# Patient Record
Sex: FEMALE | Race: BLACK OR AFRICAN AMERICAN | NOT HISPANIC OR LATINO | Employment: UNEMPLOYED | URBAN - METROPOLITAN AREA
[De-identification: names, ages, dates, MRNs, and addresses within clinical notes are randomized per-mention and may not be internally consistent; named-entity substitution may affect disease eponyms.]

---
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
## 2019-06-26 ENCOUNTER — OFFICE VISIT - HEALTHEAST (OUTPATIENT)
Dept: INTERNAL MEDICINE | Facility: CLINIC | Age: 19
End: 2019-06-26

## 2019-06-26 LAB — HCG UR QL: NEGATIVE

## 2019-06-27 ENCOUNTER — COMMUNICATION - HEALTHEAST (OUTPATIENT)
Dept: INTERNAL MEDICINE | Facility: CLINIC | Age: 19
End: 2019-06-27

## 2020-08-24 ENCOUNTER — RECORDS - HEALTHEAST (OUTPATIENT)
Dept: LAB | Facility: CLINIC | Age: 20
End: 2020-08-24

## 2020-08-26 LAB
GAMMA INTERFERON BACKGROUND BLD IA-ACNC: 0.07 IU/ML
M TB IFN-G BLD-IMP: NEGATIVE
MITOGEN IGNF BCKGRD COR BLD-ACNC: 0 IU/ML
MITOGEN IGNF BCKGRD COR BLD-ACNC: 0 IU/ML
QTF INTERPRETATION: NORMAL
QTF MITOGEN - NIL: 5.3 IU/ML

## 2021-05-30 NOTE — PROGRESS NOTES
"[facility] Clinic Note    Patient Name: Ana Singh  Patient Age: 19 y.o.  YOB: 2000  MRN: [id number]    Date of visit: 6/26/2019    Assessment/Plan:  No results found for this or any previous visit (from the past 24 hour(s)).  No medications were ordered this encounter      ICD-10-CM    1. Contraception Z78.9 Ambulatory referral to Midwifery       We discussed risks and benefits for IUD versus depo provera.  She would like to get an IUD I will have her see midwifery for this.  I advised her to use condoms in the meantime.    Patient Instructions   Use condoms until you are able to get in with midwife and for 7 days after iud.      Counseled patient regarding treatments, treatment options, risks and benefits and diagnosis.  The patient was interactive, attentive, verbalized understanding, and we discussed plan.       There is no problem list on file for this patient.    Social History     Social History Narrative     Not on file     No family history on file.  No outpatient encounter medications on file as of 6/26/2019.     No facility-administered encounter medications on file as of 6/26/2019.        Chief Complaint:   Chief Complaint   Patient presents with     Contraception     pt requesting birth control, depo shot        /62 (Patient Site: Left Arm, Patient Position: Sitting, Cuff Size: Adult Regular)   Pulse 98   Resp 20   Wt 158 lb 3 oz (71.8 kg)   LMP 08/03/2018 (Exact Date)   Breastfeeding? No   BMI 23.36 kg/m    HPI:   Has one sexual partner, male, uses condoms intermittently.      Last unprotected sex 6/20, has not had menses since being pregnant    What duration planning to use contraception:\"long time\"  Which method are you interested in:depo    Do you have painful periods, heavy periods, PMS: not that she knows of    How many sexual partners past year:1    Hormonal IUD:    Abnormal uterine bleeding:  Known uterine abnormality:no  Current foul vaginal discharge:no  Liver " disease: no  History of breast cancer: no            ROS: Pertinent ros findings in hpi, all other systems negative.    Objective/Physical Exam:     /62 (Patient Site: Left Arm, Patient Position: Sitting, Cuff Size: Adult Regular)   Pulse 98   Resp 20   Wt 158 lb 3 oz (71.8 kg)   LMP 08/03/2018 (Exact Date)   Breastfeeding? No   BMI 23.36 kg/m      Gen: NAD, appears age  Skin: warm, dry  HENT: normocephalic atraumatic, MMM  Eyes: non-icteric, no proptosis  CV: NRRR no m/r/g, no peripheral edema  Resp: CTAB no w/r/r, normal respiratory effort  Abd: non-distended, soft  Hematologic: No petechiae or purpura  MSK: no muscle or joint swelling  Neuro: no dysarthria or gross asymmetry  Psych: Cooperative, full affect        John Paul Jhaveri MD

## 2021-06-02 VITALS — WEIGHT: 163 LBS | HEIGHT: 69 IN | BODY MASS INDEX: 24.14 KG/M2

## 2021-06-03 VITALS — BODY MASS INDEX: 23.36 KG/M2 | WEIGHT: 158.19 LBS

## 2021-06-20 NOTE — PROGRESS NOTES
"[facility] Clinic Note    Patient Name: Ana Singh  Patient Age: 18 y.o.  YOB: 2000  MRN: [id number]    Date of visit: 9/7/2018    There is no problem list on file for this patient.    Social History     Social History Narrative     No family history on file.  No outpatient encounter prescriptions on file as of 9/7/2018.     No facility-administered encounter medications on file as of 9/7/2018.        Chief Complaint:   Chief Complaint   Patient presents with     Follow-up     from Hospital       /52 (Patient Site: Left Arm, Patient Position: Sitting, Cuff Size: Adult Regular)  Pulse 72  Temp 98.3  F (36.8  C) (Oral)   Ht 5' 9\" (1.753 m)  Wt 163 lb (73.9 kg)  LMP 08/03/2018 (Exact Date)  SpO2 98%  BMI 24.07 kg/m2  HPI:   No vaginal bleeding or abd pain.  First pregnancy.  LMP 8/3.  No symptoms currently.  No vomiting.    Non-smoker.  Family history noncontributory.    ROS: Pertinent ros findings in hpi, all other systems negative.    Objective/Physical Exam:     /52 (Patient Site: Left Arm, Patient Position: Sitting, Cuff Size: Adult Regular)  Pulse 72  Temp 98.3  F (36.8  C) (Oral)   Ht 5' 9\" (1.753 m)  Wt 163 lb (73.9 kg)  LMP 08/03/2018 (Exact Date)  SpO2 98%  BMI 24.07 kg/m2    Gen: NAD, appears age  Skin: warm, dry  HENT: normocephalic atraumatic, MMM  Eyes: non-icteric, no proptosis  CV: NRRR no m/r/g, no peripheral edema  Resp: CTAB no w/r/r, normal respiratory effort  Abd: non-distended, soft  Hematologic: No petechiae or purpura  MSK: no muscle or joint swelling  Neuro: no dysarthria or gross asymmetry  Psych: Cooperative, full affect    tender to palpation:slight right lower  soft:Yes  distended:no  pain with percussion:  no  hernia palpated: no  hepato-splenomegaly:no  masses: no      ecchymoses:no    suprapubic tenderness:no  >3cm pulsating mass: no    Exam limited by body habitus: no         Assessment/Plan:  No results found for this or any previous visit " (from the past 24 hour(s)).  No medications were ordered this encounter      ICD-10-CM    1. Missed period N92.6 US OB < 14 Weeks With Transvaginal     Beta-hCG, Quantitative     HM1(CBC and Differential)     Comprehensive Metabolic Panel     HM1 (CBC with Diff)       Previous records reviewed, we are repeating a pelvic ultrasound as well as a quantitative hCG, if these are in keeping with early pregnancy I will refer her to obstetrics.    There are no Patient Instructions on file for this visit.    Counseled patient regarding treatments, treatment options, risks and benefits and diagnosis.  The patient was interactive, attentive, verbalized understanding, and we discussed plan.     John Paul Jhaveri MD

## 2021-07-03 NOTE — ADDENDUM NOTE
Addendum Note by June Jhaveri MD at 9/7/2018  3:40 PM     Author: June Jhaveri MD Service: -- Author Type: Physician    Filed: 9/7/2018  3:40 PM Encounter Date: 9/7/2018 Status: Signed    : June Jhaveri MD (Physician)    Addended by: JUNE JHAVERI on: 9/7/2018 03:40 PM        Modules accepted: Orders

## 2021-11-03 ENCOUNTER — OFFICE VISIT (OUTPATIENT)
Dept: FAMILY MEDICINE | Facility: CLINIC | Age: 21
End: 2021-11-03
Payer: COMMERCIAL

## 2021-11-03 VITALS
SYSTOLIC BLOOD PRESSURE: 133 MMHG | TEMPERATURE: 98.5 F | RESPIRATION RATE: 16 BRPM | HEART RATE: 87 BPM | DIASTOLIC BLOOD PRESSURE: 78 MMHG | BODY MASS INDEX: 22.33 KG/M2 | OXYGEN SATURATION: 100 % | WEIGHT: 151.2 LBS

## 2021-11-03 DIAGNOSIS — B37.31 YEAST INFECTION OF THE VAGINA: ICD-10-CM

## 2021-11-03 DIAGNOSIS — Z91.410 HISTORY OF ADULT DOMESTIC PHYSICAL ABUSE: ICD-10-CM

## 2021-11-03 DIAGNOSIS — Z20.2 POSSIBLE EXPOSURE TO STD: Primary | ICD-10-CM

## 2021-11-03 LAB
CLUE CELLS: ABNORMAL
TRICHOMONAS, WET PREP: ABNORMAL
WBC'S/HIGH POWER FIELD, WET PREP: ABNORMAL
YEAST, WET PREP: PRESENT

## 2021-11-03 PROCEDURE — 99203 OFFICE O/P NEW LOW 30 MIN: CPT | Mod: GC

## 2021-11-03 PROCEDURE — 87210 SMEAR WET MOUNT SALINE/INK: CPT

## 2021-11-03 PROCEDURE — 87591 N.GONORRHOEAE DNA AMP PROB: CPT

## 2021-11-03 PROCEDURE — 87491 CHLMYD TRACH DNA AMP PROBE: CPT

## 2021-11-03 RX ORDER — FLUCONAZOLE 150 MG/1
150 TABLET ORAL ONCE
Qty: 1 TABLET | Refills: 0 | Status: SHIPPED | OUTPATIENT
Start: 2021-11-03 | End: 2021-11-03
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
## 2021-11-03 NOTE — PROGRESS NOTES
Preceptor Attestation:    I discussed the patient with the resident and evaluated the patient in person. I have verified the content of the note, which accurately reflects my assessment of the patient and the plan of care.   Supervising Physician:  Arnold Alatorre DO.

## 2021-11-04 ENCOUNTER — DOCUMENTATION ONLY (OUTPATIENT)
Dept: PSYCHOLOGY | Facility: CLINIC | Age: 21
End: 2021-11-04
Payer: COMMERCIAL

## 2021-11-04 LAB
C TRACH DNA SPEC QL NAA+PROBE: NEGATIVE
N GONORRHOEA DNA SPEC QL NAA+PROBE: NEGATIVE

## 2021-11-04 NOTE — PROGRESS NOTES
Behavioral Health Team,    Patient is being referred for mental health services by their provider, Dr. Ochoa.  Please advise if we are able to see patient for in house treatment or if a community option would be best.    Thank you,    Kat Vazquez  11/4/2021

## 2021-11-05 ENCOUNTER — TELEPHONE (OUTPATIENT)
Dept: FAMILY MEDICINE | Facility: CLINIC | Age: 21
End: 2021-11-05

## 2021-11-05 NOTE — TELEPHONE ENCOUNTER
[facility] phone call message- patient requesting results:    Test: Lab    Date of test: 11/3/2021    Additional Comments: would like a call back     OK to leave a message on voice mail? Yes    Primary language: English      needed? No    Call taken on November 5, 2021 at 11:49 AM by Douglas Lauren
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
## 2021-11-17 NOTE — PROGRESS NOTES
Pt scheduled    Appointment: Monday November 29  Arrival Time: 3:00pm  Provider: Dr. Young    Also schedule CPE visit     Appointment: December 22nd  Arrival Time: 2:30pm  Provider: Dr. Ochoa

## 2021-11-29 ENCOUNTER — TELEPHONE (OUTPATIENT)
Dept: PSYCHOLOGY | Facility: CLINIC | Age: 21
End: 2021-11-29
Payer: COMMERCIAL

## 2021-11-29 NOTE — TELEPHONE ENCOUNTER
Attempted to contact [place], for outreach after being unable to reach her for in-person counseling appointment with this provider  at 3:00 PM. This is patient's first no-show or late cancellation with this provider.    Attempted to contact patient by phone number recorded in medical record ending [id number] at 3:15 PM. Patient did not answer.    Left a voicemail message requesting the patient call the [facility]  at [phone] to re-schedule the appointment should she be interested.     Plan:   Primary Care/Referring Provider Dr. Ochoa will be alerted to this note for awareness.    Patient has a follow up Physical appointment scheduled 12/22 with Dr. Ochoa.    A letter will be mailed to the address on file. Following that letter, no additional outreach attempts will be made unless this provider is contacted by another referring provider.     Steve Young, PhD  Pronouns: She/her/hers  Primary Care Health Behavior Fellow

## 2021-12-02 NOTE — TELEPHONE ENCOUNTER
I have reviewed and agree with the behavioral health fellow's summary and recommendations.  Mansi Bains, PhD., LP

## 2021-12-09 ENCOUNTER — TELEPHONE (OUTPATIENT)
Dept: FAMILY MEDICINE | Facility: CLINIC | Age: 21
End: 2021-12-09

## 2021-12-09 NOTE — TELEPHONE ENCOUNTER
[facility] phone call message- general phone call:    Reason for call: pt would like a presc for yeast inf. Please call to [facility] pharmacy in [place]. Phone # [phone].    Action desired: please call presc in     Return call needed: No    OK to leave a message on voice mail? No    Advised patient to response may take up to 2 business days: No    Primary language: English      needed? No    Call taken on December 9, 2021 at 4:09 PM by Chastity Walls

## 2021-12-23 ENCOUNTER — TELEPHONE (OUTPATIENT)
Dept: PSYCHOLOGY | Facility: CLINIC | Age: 21
End: 2021-12-23
Payer: COMMERCIAL

## 2021-12-23 NOTE — TELEPHONE ENCOUNTER
Attempted to contact Ana, for outreach after being unable to reach her for in-person counseling appointment with this provider  at 3:00 PM on 11/29. This was patient's first no-show or late cancellation with this provider.     Attempted to contact patient by phone number recorded in medical record ending [id number] at 12/23. Patient did not answer.     Left a voicemail message requesting the patient call the [facility]  at [phone] to re-schedule the appointment should she be interested.      Plan:   Primary Care/Referring Provider Dr. Ochoa will be alerted to this note for awareness.     Patient has no follow up appointments at this time.      A letter will be mailed to the address on file. Following that letter, no additional outreach attempts will be made unless this provider is contacted by another referring provider.      Steve Young, PhD  Pronouns: She/her/hers  Primary Care Health Behavior Fellow

## 2022-01-03 ENCOUNTER — OFFICE VISIT (OUTPATIENT)
Dept: FAMILY MEDICINE | Facility: CLINIC | Age: 22
End: 2022-01-03
Payer: COMMERCIAL

## 2022-01-03 VITALS
BODY MASS INDEX: 24.14 KG/M2 | TEMPERATURE: 99.2 F | WEIGHT: 163 LBS | RESPIRATION RATE: 18 BRPM | HEART RATE: 84 BPM | DIASTOLIC BLOOD PRESSURE: 76 MMHG | HEIGHT: 69 IN | SYSTOLIC BLOOD PRESSURE: 130 MMHG | OXYGEN SATURATION: 97 %

## 2022-01-03 DIAGNOSIS — F41.9 ANXIETY: ICD-10-CM

## 2022-01-03 DIAGNOSIS — N89.8 VAGINAL DISCHARGE: Primary | ICD-10-CM

## 2022-01-03 DIAGNOSIS — Z31.9 PATIENT DESIRES PREGNANCY: ICD-10-CM

## 2022-01-03 DIAGNOSIS — B37.31 CANDIDIASIS OF VAGINA: ICD-10-CM

## 2022-01-03 PROCEDURE — 99213 OFFICE O/P EST LOW 20 MIN: CPT | Mod: GC | Performed by: STUDENT IN AN ORGANIZED HEALTH CARE EDUCATION/TRAINING PROGRAM

## 2022-01-03 PROCEDURE — 87210 SMEAR WET MOUNT SALINE/INK: CPT | Performed by: STUDENT IN AN ORGANIZED HEALTH CARE EDUCATION/TRAINING PROGRAM

## 2022-01-03 RX ORDER — FLUCONAZOLE 150 MG/1
150 TABLET ORAL ONCE
Qty: 1 TABLET | Refills: 0 | Status: SHIPPED | OUTPATIENT
Start: 2022-01-03 | End: 2022-01-03

## 2022-01-03 RX ORDER — PRENATAL VIT/IRON FUM/FOLIC AC 27MG-0.8MG
1 TABLET ORAL DAILY
Qty: 90 TABLET | Refills: 3 | Status: SHIPPED | OUTPATIENT
Start: 2022-01-03 | End: 2022-05-18

## 2022-01-03 ASSESSMENT — MIFFLIN-ST. JEOR: SCORE: 1555.8

## 2022-01-03 NOTE — PROGRESS NOTES
"Preceptor attestation:  Vital signs reviewed: /76 (BP Location: Right arm, Patient Position: Sitting, Cuff Size: Adult Regular)   Pulse 84   Temp 99.2  F (37.3  C) (Oral)   Resp 18   Ht 1.74 m (5' 8.5\")   Wt 73.9 kg (163 lb)   LMP 12/24/2021 (Approximate)   SpO2 97%   BMI 24.42 kg/m      Patient seen, evaluated, and discussed with the resident.  I have verified the content of the note, which accurately reflects my assessment of the patient and the plan of care.    Supervising physician: Chela Finney MD  [facility]  "
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
## 2022-01-03 NOTE — PATIENT INSTRUCTIONS
Thank you for discussing your health with us today!    We discussed the following during your visit:    1. Treatment for yeast infection is a one time oral medication called Fluconazole/Diflucan.  2. Please start taking the vitamins.  3. Please let us know if you think you're pregnant. We're happy to test.  4. I have referred you to our mental health team. Please call if you have not heard back from us in 2 weeks.     As always, please call the clinic if you have any questions or concerns.    Your doctor has put in a mental health referral for psychotherapy and/or psychiatry treatment. Our behavioral health team will evaluate your referral to see if they have any more recommendations or if they have openings for therapy in-house at [facility]. Our referral team would then contact you within 1-2 weeks with this update. However, if you want to get started for psychotherapy or schedule with a psychiatrist sooner, we recommend you call one or two of the following resources.  Let your doctor know if you do connect with a therapist or psychiatrist.     Community Mental Health Agencies:    [facility] [address] [address]  [phone] (for appointments)    [facility]  [address]  [place] [address]  Phone:  [phone]    [facility]  [address]  [place] [address]  [phone]    [facility]  [address]  [place], [place] [address]  [phone]    [facility]  [address]  [place] [address]  [phone]    Community Psychiatry Agencies:    [facility]  [address], [place] [address]  [phone]    [facility]  [address] [place] [address]  [phone] (for appointments)    [facility]  [address]  [address]  [place] [address]  Phone:  [phone]    [facility]  [address]  [place], [place] [address] [phone]    [facility]  [address]  [place] [address]  [phone]    [facility] - offers psychiatry and psychology services across the lifespan  [facility]  [address] [place] [address] [address]  [place], [place]  [phone]

## 2022-03-19 DIAGNOSIS — Z11.3 SCREENING EXAMINATION FOR SEXUALLY TRANSMITTED DISEASE: Primary | ICD-10-CM
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
## 2022-03-21 ENCOUNTER — OFFICE VISIT (OUTPATIENT)
Dept: FAMILY MEDICINE | Facility: CLINIC | Age: 22
End: 2022-03-21
Payer: COMMERCIAL

## 2022-03-21 VITALS
HEART RATE: 82 BPM | SYSTOLIC BLOOD PRESSURE: 106 MMHG | BODY MASS INDEX: 24.06 KG/M2 | TEMPERATURE: 98.6 F | WEIGHT: 160.6 LBS | RESPIRATION RATE: 16 BRPM | DIASTOLIC BLOOD PRESSURE: 70 MMHG | OXYGEN SATURATION: 97 %

## 2022-03-21 DIAGNOSIS — Z11.3 SCREENING EXAMINATION FOR SEXUALLY TRANSMITTED DISEASE: Primary | ICD-10-CM

## 2022-03-21 DIAGNOSIS — F41.9 ANXIETY: ICD-10-CM

## 2022-03-21 DIAGNOSIS — F43.10 PTSD (POST-TRAUMATIC STRESS DISORDER): ICD-10-CM

## 2022-03-21 DIAGNOSIS — A60.00 GENITAL HERPES SIMPLEX, UNSPECIFIED SITE: ICD-10-CM

## 2022-03-21 LAB
CLUE CELLS: ABNORMAL
HCV AB SERPL QL IA: NEGATIVE
HIV 1+2 AB+HIV1 P24 AG SERPL QL IA: NEGATIVE
T PALLIDUM AB SER QL: NONREACTIVE
TRICHOMONAS, WET PREP: ABNORMAL
WBC'S/HIGH POWER FIELD, WET PREP: ABNORMAL
YEAST, WET PREP: ABNORMAL

## 2022-03-21 PROCEDURE — 87210 SMEAR WET MOUNT SALINE/INK: CPT | Performed by: STUDENT IN AN ORGANIZED HEALTH CARE EDUCATION/TRAINING PROGRAM

## 2022-03-21 PROCEDURE — 99214 OFFICE O/P EST MOD 30 MIN: CPT | Mod: GC | Performed by: STUDENT IN AN ORGANIZED HEALTH CARE EDUCATION/TRAINING PROGRAM

## 2022-03-21 PROCEDURE — 87389 HIV-1 AG W/HIV-1&-2 AB AG IA: CPT | Performed by: STUDENT IN AN ORGANIZED HEALTH CARE EDUCATION/TRAINING PROGRAM

## 2022-03-21 PROCEDURE — 87591 N.GONORRHOEAE DNA AMP PROB: CPT | Performed by: STUDENT IN AN ORGANIZED HEALTH CARE EDUCATION/TRAINING PROGRAM

## 2022-03-21 PROCEDURE — 87491 CHLMYD TRACH DNA AMP PROBE: CPT | Performed by: STUDENT IN AN ORGANIZED HEALTH CARE EDUCATION/TRAINING PROGRAM

## 2022-03-21 PROCEDURE — 86803 HEPATITIS C AB TEST: CPT | Performed by: STUDENT IN AN ORGANIZED HEALTH CARE EDUCATION/TRAINING PROGRAM

## 2022-03-21 PROCEDURE — 86780 TREPONEMA PALLIDUM: CPT | Performed by: STUDENT IN AN ORGANIZED HEALTH CARE EDUCATION/TRAINING PROGRAM

## 2022-03-21 PROCEDURE — 36415 COLL VENOUS BLD VENIPUNCTURE: CPT | Performed by: STUDENT IN AN ORGANIZED HEALTH CARE EDUCATION/TRAINING PROGRAM

## 2022-03-21 RX ORDER — VALACYCLOVIR HYDROCHLORIDE 500 MG/1
500 TABLET, FILM COATED ORAL 2 TIMES DAILY
Qty: 6 TABLET | Refills: 3 | Status: SHIPPED | OUTPATIENT
Start: 2022-03-21 | End: 2022-05-03

## 2022-03-21 ASSESSMENT — ANXIETY QUESTIONNAIRES
6. BECOMING EASILY ANNOYED OR IRRITABLE: NEARLY EVERY DAY
1. FEELING NERVOUS, ANXIOUS, OR ON EDGE: NEARLY EVERY DAY
7. FEELING AFRAID AS IF SOMETHING AWFUL MIGHT HAPPEN: MORE THAN HALF THE DAYS
3. WORRYING TOO MUCH ABOUT DIFFERENT THINGS: NEARLY EVERY DAY
GAD7 TOTAL SCORE: 19
2. NOT BEING ABLE TO STOP OR CONTROL WORRYING: NEARLY EVERY DAY
5. BEING SO RESTLESS THAT IT IS HARD TO SIT STILL: NEARLY EVERY DAY

## 2022-03-21 ASSESSMENT — PATIENT HEALTH QUESTIONNAIRE - PHQ9
5. POOR APPETITE OR OVEREATING: MORE THAN HALF THE DAYS
SUM OF ALL RESPONSES TO PHQ QUESTIONS 1-9: 17
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
## 2022-03-21 NOTE — PROGRESS NOTES
I have reviewed and agree with the behavioral health fellow's documentation for this visit.  I did not personally see the patient.  Mansi Bains, PhD., LP

## 2022-03-21 NOTE — PROGRESS NOTES
Preceptor Attestation:    I discussed the patient with the resident and evaluated the patient in person. I have verified the content of the note, which accurately reflects my assessment of the patient and the plan of care.   Supervising Physician:  Carlos Whitten MD.

## 2022-03-21 NOTE — PATIENT INSTRUCTIONS
Dear Ana Singh,    It was nice to meet you!    1. Take valacyclovir 500 mg 2 times per day for 3 days  2.  the next refill to have on hand  3. Set up MyChart  4. Come back for a follow-up visit or general physical in the next few weeks. I'd love to see you again and see how you're doing.   5. If you have concerns about your kiddo or if he's due for his yearly check-up, I see kids too.  You can schedule your and his visits together if you'd like.      As always, please call the clinic if you have any questions or concerns. A doctor or nurse is available 24/7 to answer questions.     Be well,    Dr. Estrellita Kenyon          Patient Education     Genital Herpes    Genital herpes is a common sexually transmitted infection (STI). It is caused by the herpes simplex virus (HSV). One out of 5 teens and adults carry the herpes virus. During an outbreak, it causes small blisters that break open, leaving small, painful sores in the genital area. Eventually, scabs form and the sores heal. In women, these show up most often on the skin just outside the vaginal opening. They can occur on the buttocks, anus, or cervix. In men, the sores are usually on the tip, sides, or base of the penis. They also occur on the scrotum, buttocks, or thighs.  The first outbreak begins within 2 to 3 weeks after exposure to an infected sexual partner. It may last 1 to 3 weeks. It may cause headache, muscle ache, and fevers. The first outbreak is usually the worst. Because the virus remains in the body even after the sores heal, most people will have more outbreaks. The frequency of outbreaks is different for each person. Some people will never have another outbreak. Others will have several episodes a year. Later outbreaks are usually shorter, milder, and less painful. For many, the number of outbreaks tends to decrease over time. Various factors may trigger an outbreak. These include:    Emotional stress    Menstruation    Presence of  another illness (cold, flu, or fever from any cause)    Overexertion and fatigue    Weak immune system  Home care    It is very important that you do not have sexual relations until all the herpes sores have healed completely.    Wash the affected area gently with mild soap and water. Wash your hands after touching the affected area.    You may use over-the-counter pain medicine unless another pain medicine was prescribed. If you have chronic liver or kidney disease or have ever had a stomach ulcer or gastrointestinal bleeding, talk with your healthcare provider before using these medicines. Also talk to your provider if you are taking medicine to prevent blood clots. Aspirin should never be given to anyone younger than 18 years of age who is ill with a viral infection or fever. It may cause severe liver or brain damage.    Your healthcare provider may prescribe antiviral medicine during the first outbreak. This will help the sores heal faster. Antiviral medicine may also be prescribed so that you have it ready to take at the first sign of another outbreak. This will help the symptoms go away sooner. For people with frequent outbreaks, daily preventive therapy may be prescribed. This will help reduce the frequency of attacks. Daily preventive therapy may also reduce risk of spread of herpes to your sexual partner. Discuss the risks and benefits of daily therapy with your healthcare provider.    If you are a woman who is pregnant now or may become pregnant in the future, let your healthcare provider know that you have had herpes. This may affect the way your baby is delivered.  Preventing spread to others  The virus is spread by sexual contact with someone who has the herpes virus. The risk of spread is highest when the sores are present. However, there is a chance of spreading the virus even when sores are not visible. Inform future sexual partners that you have herpes and that they may become infected. To reduce  the risk of passing the virus to a partner who has never had herpes, avoid sexual relations at the first sign of an outbreak and until the sores are fully healed. Latex barriers, such as condoms, reduce the risk of spread between outbreaks if the infected site is covered, but they do not guarantee protection.  Follow-up care  Follow up with your healthcare provider, or as advised.  People who have just learned that they have herpes may feel upset. Getting the facts about herpes can help you feel more in control. Follow up with your healthcare provider or the public health department for complete STI screening, including HIV testing.  When to seek medical advice  Call your healthcare provider right away if any of these occur:    Inability to urinate due to pain    Swelling or increasing redness in the genital area    Discharge from the vagina or penis    Increasing back or abdominal pain    Rash or joint pain  Call 911  Call 911 or get immediate medical care if any of these occur:    Unusual drowsiness, weakness, or confusion    Worsening headache or stiff neck  [employer] last reviewed this educational content on 6/1/2018 2000-2021 [employer]. All rights reserved. This information is not intended as a substitute for professional medical care. Always follow your healthcare professional's instructions.

## 2022-03-22 LAB
C TRACH DNA SPEC QL NAA+PROBE: NEGATIVE
N GONORRHOEA DNA SPEC QL NAA+PROBE: NEGATIVE

## 2022-03-22 ASSESSMENT — ANXIETY QUESTIONNAIRES: GAD7 TOTAL SCORE: 19

## 2022-04-11 ENCOUNTER — VIRTUAL VISIT (OUTPATIENT)
Dept: PSYCHOLOGY | Facility: CLINIC | Age: 22
End: 2022-04-11
Payer: COMMERCIAL

## 2022-04-11 DIAGNOSIS — F43.10 PTSD (POST-TRAUMATIC STRESS DISORDER): Primary | ICD-10-CM

## 2022-04-11 PROCEDURE — 90837 PSYTX W PT 60 MINUTES: CPT | Mod: 95 | Performed by: PSYCHOLOGIST
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
## 2022-04-18 NOTE — PATIENT INSTRUCTIONS
[facility]  Mental Health and Addiction Services  [address] [place] [address]  [phone]    First Session Patient Information Sheet    My name is Steve Young, Ph.D. and I look forward to working with you! I earned my Ph.D. in Counseling Psychology at [employer]. I am currently in a postdoctoral fellowship at [facility] to receive specialized training in primary care behavioral health. I am also working to complete my supervised hours to become a licensed psychologist in the [facility].     My direct supervisor at the [facility] is licensed psychologist Mansi Bains, Ph.D. She and I meet individually each week to discuss my training and clinical caseload. Just like the residents you may have worked with, the work you and I complete together will be billed under my supervisor's name. Therefore, you will likely see reports from your insurance provider with Dr. Bains's name rather than mine. Dr. Bains can be reached at [phone] if you have any questions or concerns.     Here is some general information about behavioral health services. Please note that your exact experience may be different based on our discussion today.    Your first 1-2 sessions are focused on assessment.This means that I will be exploring what brings you in today and what you are hoping to get out of behavioral health services. I will likely ask you a lot of questions about your current symptoms, health history, as well as information about your relationships, emotions, behaviors, experiences, childhood, family of origin, etc. I may also ask you to complete several questionnaires and checklists as part of that assessment process.    When the assessment is completed, we will review the results and work together to develop a plan for treatment. This discussion will include recommendations for services that are most appropriate for you based on available research  and practice guidelines. Many times, I will continue with your care unless it becomes clear that we need to refer you to another provider or organization better suited to meet your specific needs. We may also discuss other services that you may benefit from engaging with, such as the social work team or the clinic's legal partnership.    Most patients attend appointments once a week or once every other week at the beginning of treatment. However, we will discuss a schedule that best fits your needs.    If you get primary care services here at the clinic, I will also update your provider about your diagnosis and treatment plan to coordinate your care.    Due to confidentiality, I cannot exchange e-mail with patients. If you need to reach me, please leave a message through the  [phone].    I am committed to providing my patients with the best care possible. That means it is important to me that I provider services that fit for your unique needs and preferences. If there is anything you would like me to do differently, please let me know at any time!    Thank you, and I look forward to working with you!    Steve Young, PhD  She/her/hers  Primary Care Behavioral Health Fellow

## 2022-04-28 ENCOUNTER — IMMUNIZATION (OUTPATIENT)
Dept: NURSING | Facility: CLINIC | Age: 22
End: 2022-04-28
Payer: COMMERCIAL

## 2022-04-28 PROCEDURE — 91301 PR COVID VAC MODERNA 100 MCG/0.5 ML IM: CPT

## 2022-04-28 PROCEDURE — 0011A PR COVID VAC MODERNA 100 MCG/0.5 ML IM: CPT

## 2022-04-29 ENCOUNTER — TELEPHONE (OUTPATIENT)
Dept: FAMILY MEDICINE | Facility: CLINIC | Age: 22
End: 2022-04-29
Payer: COMMERCIAL

## 2022-04-29 ENCOUNTER — LAB (OUTPATIENT)
Dept: LAB | Facility: CLINIC | Age: 22
End: 2022-04-29
Payer: COMMERCIAL

## 2022-04-29 DIAGNOSIS — Z11.1 SCREENING EXAMINATION FOR PULMONARY TUBERCULOSIS: Primary | ICD-10-CM

## 2022-04-29 PROCEDURE — 86481 TB AG RESPONSE T-CELL SUSP: CPT

## 2022-04-29 PROCEDURE — 36415 COLL VENOUS BLD VENIPUNCTURE: CPT

## 2022-04-29 NOTE — TELEPHONE ENCOUNTER
Ana Singh called to schedule an appointment for TB screening.        TB Screening questions  1. Have you had recent contact with a person with active tuberculosis (TB)?   No, continue to next question.  2. Have you ever been treated for tuberculosis (TB) or latent TB before?  No, continue to next question.  3. Has a county worker or another healthcare worker (not your employer) told you to come in to be tested for TB?  No, continue to next question.  4. Have you had a live vaccine (smallpox, flumist, MMR, varicella, oral polio and/or yellow fever) in the last 4 weeks?  No, lab visit scheduled.       Lab visit scheduled for 4/29/2022 at 0930 am.    Myrna Cochran

## 2022-04-29 NOTE — PROGRESS NOTES
Ana Singh presents for a blood draw TB screening test.    TB Screening questions  1. Have you had recent contact with a person with active tuberculosis (TB)?  No, continue to next question.  2. Have you ever been treated for tuberculosis (TB) or latent TB before?  No, continue to next question.  3. Has a county worker or another healthcare worker (not your employer) told you to come in to be tested for TB?  No, continue to next question.  4. Have you had a live vaccine (smallpox, flumist, MMR, varicella, oral polio and/or yellow fever) in the last 4 weeks?  No, continued with lab visit/blood draw.    Educated patient about when to expect the lab results via phone/mail/Resilinchart.    Sandrita Andrade

## 2022-05-01 LAB
GAMMA INTERFERON BACKGROUND BLD IA-ACNC: 0.12 IU/ML
M TB IFN-G BLD-IMP: NEGATIVE
M TB IFN-G CD4+ BCKGRND COR BLD-ACNC: 9.88 IU/ML
MITOGEN IGNF BCKGRD COR BLD-ACNC: -0.02 IU/ML
MITOGEN IGNF BCKGRD COR BLD-ACNC: -0.02 IU/ML
QUANTIFERON MITOGEN: 10 IU/ML
QUANTIFERON NIL TUBE: 0.12 IU/ML
QUANTIFERON TB1 TUBE: 0.1 IU/ML
QUANTIFERON TB2 TUBE: 0.1

## 2022-05-16 ENCOUNTER — HEALTH MAINTENANCE LETTER (OUTPATIENT)
Age: 22
End: 2022-05-16

## 2022-05-17 ENCOUNTER — TELEPHONE (OUTPATIENT)
Dept: FAMILY MEDICINE | Facility: CLINIC | Age: 22
End: 2022-05-17
Payer: COMMERCIAL

## 2022-05-18 ENCOUNTER — OFFICE VISIT (OUTPATIENT)
Dept: FAMILY MEDICINE | Facility: CLINIC | Age: 22
End: 2022-05-18
Payer: COMMERCIAL

## 2022-05-18 VITALS
BODY MASS INDEX: 23.49 KG/M2 | HEART RATE: 86 BPM | TEMPERATURE: 99.3 F | RESPIRATION RATE: 16 BRPM | WEIGHT: 156.8 LBS | DIASTOLIC BLOOD PRESSURE: 77 MMHG | SYSTOLIC BLOOD PRESSURE: 125 MMHG | OXYGEN SATURATION: 99 %

## 2022-05-18 DIAGNOSIS — N89.8 VAGINAL DISCHARGE: Primary | ICD-10-CM

## 2022-05-18 DIAGNOSIS — B37.31 CANDIDIASIS OF VAGINA: ICD-10-CM

## 2022-05-18 PROCEDURE — 87210 SMEAR WET MOUNT SALINE/INK: CPT | Performed by: STUDENT IN AN ORGANIZED HEALTH CARE EDUCATION/TRAINING PROGRAM

## 2022-05-18 PROCEDURE — 99213 OFFICE O/P EST LOW 20 MIN: CPT | Mod: GC | Performed by: STUDENT IN AN ORGANIZED HEALTH CARE EDUCATION/TRAINING PROGRAM

## 2022-05-18 RX ORDER — FLUCONAZOLE 150 MG/1
150 TABLET ORAL ONCE
Qty: 1 TABLET | Refills: 0 | Status: SHIPPED | OUTPATIENT
Start: 2022-05-18 | End: 2022-05-18

## 2022-05-18 NOTE — PROGRESS NOTES
"[facility]    Assessment & Plan   1. Vaginal discharge  2. Candidiasis of vagina  + yeast, prescription for diflucan sent.   - Wet preparation   - fluconazole (DIFLUCAN) 150 MG tablet; Take 1 tablet (150 mg) by mouth once for 1 dose  Dispense: 1 tablet; Refill: 0       Options for treatment and follow-up care were reviewed with the patient who was engaged and actively involved in the decision making process, verbalized understanding of the options discussed, and satisfied with the final plan.    Patient was staffed with supervising physician, Dr. Alatorre.     Kat Rivas MD, PGY3  [facility]    Subjective   Ana Singh is a 21 year old female with a history including PTSD/anxiety, HSV2 who presents for possible yeast infection.    Vaginal Symptoms  Onset: Sunday night (day 4); noticed redness of the labia and mild swelling at first now discharge    Description:  Vaginal Discharge: Clumpy white discahrge \"like cottage cheese\"  Itching (Pruritis): Yes Details: only at night  Burning sensation: no  Odor: Yes Details: :\"yeasty smell\"    Accompanying Signs & Symptoms:  Pain with Urination: no  Abdominal Pain: no  Fever: no    History:   Sexually active: no  New Partner: no  Possibility of Pregnancy:  No    Precipitating factors:   Recent Antibiotic Use: no     Of note, patient has a history of yeast infections in the past.       Patient Active Problem List    Diagnosis Date Noted     PTSD (post-traumatic stress disorder) 03/21/2022     Priority: Medium     Genital herpes simplex, unspecified site 03/21/2022     Priority: Medium     Anxiety 03/21/2022     Priority: Medium       Social: She reports that she is a non-smoker but has been exposed to tobacco smoke. She has never used smokeless tobacco.    There are no exam notes on file for this visit.    Objective     Vitals:    05/18/22 1627   BP: 125/77   Pulse: 86   Resp: 16   Temp: 99.3  F (37.4  C)   SpO2: 99% "   Weight: 71.1 kg (156 lb 12.8 oz)     Body mass index is 23.49 kg/m .    GEN: NAD, healthy, alert  Constitutional: Awake, alert, cooperative, no acute distress, and appears stated age.  HEENT: NC/AT, EOMI, normal conjunctivae/sclerae, mask on  Lungs: No increased WOB.   Abdomen: Soft, non-distended, non-tender, no masses palpated  Musculoskeletal: No obvious redness, warmth, or swelling of the joints.  Neurologic: A&Ox3.  Cranial nerves II-XII are grossly intact.  Gait is normal  Neuropsychiatric: Normal affect, mood, orientation, memory and insight.  Skin: No visible rashes, erythema, pallor, petechia or purpura.

## 2022-06-03 ENCOUNTER — IMMUNIZATION (OUTPATIENT)
Dept: NURSING | Facility: CLINIC | Age: 22
End: 2022-06-03
Attending: FAMILY MEDICINE
Payer: COMMERCIAL

## 2022-06-03 PROCEDURE — 91301 PR COVID VAC MODERNA 100 MCG/0.5 ML IM: CPT

## 2022-06-03 PROCEDURE — 0012A PR COVID VAC MODERNA 100 MCG/0.5 ML IM: CPT

## 2022-07-07 ENCOUNTER — TELEPHONE (OUTPATIENT)
Dept: FAMILY MEDICINE | Facility: CLINIC | Age: 22
End: 2022-07-07

## 2022-07-08 ENCOUNTER — OFFICE VISIT (OUTPATIENT)
Dept: FAMILY MEDICINE | Facility: CLINIC | Age: 22
End: 2022-07-08
Payer: COMMERCIAL

## 2022-07-08 VITALS
HEIGHT: 68 IN | RESPIRATION RATE: 20 BRPM | SYSTOLIC BLOOD PRESSURE: 120 MMHG | BODY MASS INDEX: 23.52 KG/M2 | TEMPERATURE: 98.4 F | HEART RATE: 76 BPM | OXYGEN SATURATION: 99 % | WEIGHT: 155.2 LBS | DIASTOLIC BLOOD PRESSURE: 76 MMHG

## 2022-07-08 DIAGNOSIS — Z32.00 ENCOUNTER FOR PREGNANCY TEST, RESULT UNKNOWN: ICD-10-CM

## 2022-07-08 DIAGNOSIS — A60.00 GENITAL HERPES SIMPLEX, UNSPECIFIED SITE: ICD-10-CM

## 2022-07-08 DIAGNOSIS — Z63.79 STRESSFUL LIFE EVENT AFFECTING FAMILY: ICD-10-CM

## 2022-07-08 DIAGNOSIS — N89.8 VAGINAL ITCHING: Primary | ICD-10-CM

## 2022-07-08 LAB
CLUE CELLS: ABNORMAL
HCG UR QL: NEGATIVE
TRICHOMONAS, WET PREP: ABNORMAL
WBC'S/HIGH POWER FIELD, WET PREP: ABNORMAL
YEAST, WET PREP: PRESENT

## 2022-07-08 PROCEDURE — 99213 OFFICE O/P EST LOW 20 MIN: CPT | Mod: GC

## 2022-07-08 PROCEDURE — 81025 URINE PREGNANCY TEST: CPT

## 2022-07-08 PROCEDURE — 87210 SMEAR WET MOUNT SALINE/INK: CPT

## 2022-07-08 RX ORDER — FLUCONAZOLE 150 MG/1
150 TABLET ORAL ONCE
Qty: 1 TABLET | Refills: 0 | Status: SHIPPED | OUTPATIENT
Start: 2022-07-08 | End: 2022-07-08

## 2022-07-08 RX ORDER — VALACYCLOVIR HYDROCHLORIDE 500 MG/1
500 TABLET, FILM COATED ORAL 2 TIMES DAILY
Qty: 34 UNITS | Refills: 3 | Status: SHIPPED | OUTPATIENT
Start: 2022-07-08 | End: 2023-01-20

## 2022-07-08 NOTE — PROGRESS NOTES
"  {PROVIDER CHARTING PREFERENCE:196102}    Subjective   Maribell is a 22 year old{ACCOMPANIED BY STATEMENT (Optional):813156}, presenting for the following health issues:  Other (Yeast infection, itchy, smell, milky cloudy, want pregnancy test due to no protection)      HPI     {SUPERLIST (Optional):378057}  {additonal problems for provider to add (Optional):013709}    Nightly itching   Recurrent yeast infections  New soaps     Single parent now doing phlebotomy   Learning how to drive       Anxiety noted by parent teacher conference  Recent breakup with on again off again partner     Review of Systems   {ROS COMP (Optional):494611}      Anxiety, stress  Vaginal itching  Sharp chest pain with certain arm movements   Lump on R labia for a while, not painful   Objective    /76   Pulse 76   Temp 98.4  F (36.9  C) (Oral)   Resp 20   Ht 1.732 m (5' 8.2\")   Wt 70.4 kg (155 lb 3.2 oz)   SpO2 99%   BMI 23.46 kg/m    Body mass index is 23.46 kg/m .  Physical Exam   {Exam List (Optional):460147}    {Diagnostic Test Results (Optional):092526}    {AMBULATORY ATTESTATION (Optional):707317}            .  ..    "

## 2022-07-08 NOTE — PROGRESS NOTES
Assessment & Plan     Vaginal itching  Patient has been having genital itching, odor, milky discharge since starting sugar scrub and new soap in vulvar area.  Educated on proper vulvar hygiene.  -Hold use of soaps and sugar scrubs in vulvar area.  Cleanse only with warm water and washcloth.  - Wet preparation, positive for yeast infection  - fluconazole (DIFLUCAN) 150 MG tablet; Take 1 tablet (150 mg) by mouth once for 1 dose    Encounter for pregnancy test, result unknown  Recent unprotected sex.  No missed period.  Wanted to confirm.  - HCG qualitative urine    Genital herpes simplex, unspecified site  Medication refill.  - valACYclovir (VALTREX) 500 MG tablet; Take 1 tablet (500 mg) by mouth 2 times daily    Restful life event affecting family  Patient is finishing phlebotomy school, is a single parent, and is learning how to drive currently.  Feeling some sensation of anxiety/feeling overwhelmed.  Some loss of interest but no suicidal ideation.  Offered counseling on managing stressful events.  -Patient to follow-up with therapist as needed    Diagnosis or treatment significantly limited by social determinants of health - limited health literacy, single parenthood  Ordering of each unique test  Prescription drug management  35 minutes spent on the date of the encounter doing chart review, history and exam, documentation and further activities per the note       Follow up for evaluation of vulvar swelling    Return in about 1 month (around 8/8/2022) for Follow up.    Crystal Ochoa MD  [facility] PEE Reyna is a 22 year old presenting for the following health issues:  Other (Yeast infection, itchy, smell, milky cloudy, want pregnancy test due to no protection)      HPI     Patient comes in today for evaluation of possible yeast infection and urine pregnancy test.    Patient has been having multiple yeast infections this year.  Reports that she has been using a new sugar scrub  "and pH balanced soap in her vulvar area.  Does not use anything on the anterior vaginal canal.  Had a recent episode of unprotected sex and would like pregnancy test to rule out.    Reported some anxiety due to recent life stressors.  She is completing phlebotomy training, looking for a job, learning to drive, being a single parent, and breaking up with partner.  Offered some counseling on taking things 1 task at a time, and finding small moments throughout the day to give herself some calm/happiness.  Patient is in contact with her therapist, and knows to reach out when things become overwhelming.  Currently does not report any suicidal ideations, but does report feeling overwhelmed and not able to enjoy things she previously enjoyed.    Review of Systems       Positive for sharp chest pain with specific arm movement, anxiety, anhedonia, lump on vulva, malodorous discharge, vaginal itching.    Negative for fever, chills, heart palpitations, shortness of breath, nausea, vomiting, skin rash, dysuria.    Objective    /76   Pulse 76   Temp 98.4  F (36.9  C) (Oral)   Resp 20   Ht 1.732 m (5' 8.2\")   Wt 70.4 kg (155 lb 3.2 oz)   SpO2 99%   BMI 23.46 kg/m    Body mass index is 23.46 kg/m .  Physical Exam   GENERAL: healthy, alert and no distress  RESP: lungs clear to auscultation - no rales, rhonchi or wheezes  CV: regular rate and rhythm, normal S1 S2, no S3 or S4, no murmur, click or rub, no peripheral edema and peripheral pulses strong  ABDOMEN: soft, nontender, no hepatosplenomegaly, no masses and bowel sounds normal  MS: no gross musculoskeletal defects noted, no edema, able to elicit sharp chest pain with specific arm movement  PSYCH: mentation appears normal, affect normal/bright    Results for orders placed or performed in visit on 07/08/22 (from the past 24 hour(s))   Wet preparation    Specimen: Vagina; Swab   Result Value Ref Range    Trichomonas Absent Absent    Yeast Present (A) Absent    Clue " Cells Absent Absent    WBCs/high power field 2+ (A) None    Narrative    Many bacteria; negative odor     HCG qualitative urine   Result Value Ref Range    hCG Urine Qualitative Negative Negative       ----- Service Performed and Documented by Resident or Fellow ------            .  ..

## 2022-07-08 NOTE — PATIENT INSTRUCTIONS
Take diflucan once.   Avoid soaps, scrubs or anything besides warm water by vulva.    Schedule appointment for evaluation of labial lump.     Stay well, and we will see you soon!    Sincerely,    Crystal Ochoa MD

## 2022-08-01 ENCOUNTER — OFFICE VISIT (OUTPATIENT)
Dept: FAMILY MEDICINE | Facility: CLINIC | Age: 22
End: 2022-08-01
Payer: COMMERCIAL

## 2022-08-01 VITALS
TEMPERATURE: 99.1 F | SYSTOLIC BLOOD PRESSURE: 115 MMHG | DIASTOLIC BLOOD PRESSURE: 74 MMHG | BODY MASS INDEX: 22.64 KG/M2 | RESPIRATION RATE: 20 BRPM | HEART RATE: 82 BPM | OXYGEN SATURATION: 99 % | WEIGHT: 149.8 LBS

## 2022-08-01 DIAGNOSIS — Z11.51 SCREENING FOR HUMAN PAPILLOMAVIRUS: ICD-10-CM

## 2022-08-01 DIAGNOSIS — B37.31 YEAST INFECTION OF THE VAGINA: Primary | ICD-10-CM

## 2022-08-01 PROCEDURE — 87210 SMEAR WET MOUNT SALINE/INK: CPT | Performed by: STUDENT IN AN ORGANIZED HEALTH CARE EDUCATION/TRAINING PROGRAM

## 2022-08-01 PROCEDURE — 99213 OFFICE O/P EST LOW 20 MIN: CPT | Mod: GC | Performed by: STUDENT IN AN ORGANIZED HEALTH CARE EDUCATION/TRAINING PROGRAM

## 2022-08-01 PROCEDURE — G0145 SCR C/V CYTO,THINLAYER,RESCR: HCPCS | Performed by: STUDENT IN AN ORGANIZED HEALTH CARE EDUCATION/TRAINING PROGRAM

## 2022-08-01 RX ORDER — FLUCONAZOLE 150 MG/1
150 TABLET ORAL ONCE
Qty: 2 TABLET | Refills: 0 | Status: SHIPPED | OUTPATIENT
Start: 2022-08-01 | End: 2022-08-01

## 2022-08-01 NOTE — PATIENT INSTRUCTIONS
Please continue to avoid body washes and scents, as these can increase risk of yeast infection. We will call with results when they are available. Your pap results will be available in 1 week.

## 2022-08-01 NOTE — PROGRESS NOTES
Preceptor attestation:  Vital signs reviewed: /74   Pulse 82   Temp 99.1  F (37.3  C) (Oral)   Resp 20   Wt 67.9 kg (149 lb 12.8 oz)   SpO2 99%   BMI 22.64 kg/m      Patient seen, evaluated, and discussed with the resident.  I have verified the content of the note, which accurately reflects my assessment of the patient and the plan of care.    Supervising physician: Chela Finney MD  [facility]

## 2022-08-01 NOTE — PROGRESS NOTES
Assessment & Plan     Screening for human papillomavirus  Pap completed today, result pending.  - GYN Cytology    Yeast infection of the vagina  Repeated wet prep today, noting on exam that she has discharge that could be consistent with yeast infection.  Unclear whether or not this is undertreated versus recurrent.  Reinforced with her that she should use minimally scented soaps at most for hygiene purposes.  We will consider additional work-up for other possible sources of these infections, such as diabetes.  It is entirely possible that she has recurrent yeast infections from using scented washes.  Does not appear to be consistent with UTI at this time.  - Wet preparation    Review of prior external note(s) from - previous office visits  Review of the result(s) of each unique test - previous 3 wet preps, UPT  30 minutes spent on the date of the encounter doing chart review, history and exam, documentation and further activities per the note       Ailyn Mills MD PGY3  [facility]  Precepted with Dr. Finney    Subjective   Ana Singh is a 22 year old who presents for the following health issues  accompanied by her son    HPI     Presents for discussion of ongoing vaginal discharge.  She says that the itchiness and swelling has resolved since her last visit on 7/8/2022.  She is concerned that she is having ongoing/recurrent yeast infection.  She mentions that she took the advice of her PCP, and stopped using pH balanced washes; however, she bought a new 1 which was peach scented, and she is unsure if this is contributing to her symptoms.    She also desires routine Pap testing, noting that her last one was greater than 3 years ago.  She has no concerns with abnormal vaginal bleeding, or pain.    Denies any urinary changes, fevers, chills, lower abdominal pain.    Review of Systems   Complete ROS normal aside noted in HPI      Objective    /74   Pulse 82   Temp 99.1  F  (37.3  C) (Oral)   Resp 20   Wt 67.9 kg (149 lb 12.8 oz)   SpO2 99%   BMI 22.64 kg/m    Body mass index is 22.64 kg/m .    Physical Exam   GENERAL: healthy, alert and no distress  RESP: lungs clear to auscultation - no rales, rhonchi or wheezes  CV: regular rate and rhythm, normal S1 S2, no S3 or S4, no murmur, click or rub, no peripheral edema and peripheral pulses strong  ABDOMEN: soft, nontender, no hepatosplenomegaly, no masses and bowel sounds normal   (female): normal female external genitalia, normal urethral meatus, vaginal mucosa, normal cervix/adnexa/uterus without masses, thick white discharge noted  MS: no gross musculoskeletal defects noted, no edema    Pap and wet prep pending    ----- Service Performed and Documented by Resident or Fellow ------

## 2022-08-03 LAB
BKR LAB AP GYN ADEQUACY: NORMAL
BKR LAB AP GYN INTERPRETATION: NORMAL
BKR LAB AP HPV REFLEX: NORMAL
BKR LAB AP PREVIOUS ABNORMAL: NORMAL
PATH REPORT.COMMENTS IMP SPEC: NORMAL
PATH REPORT.COMMENTS IMP SPEC: NORMAL
PATH REPORT.RELEVANT HX SPEC: NORMAL

## 2022-08-25 ENCOUNTER — TELEPHONE (OUTPATIENT)
Dept: FAMILY MEDICINE | Facility: CLINIC | Age: 22
End: 2022-08-25

## 2022-08-25 DIAGNOSIS — Z30.012 EMERGENCY CONTRACEPTION: Primary | ICD-10-CM

## 2022-08-25 RX ORDER — LEVONORGESTREL 1.5 MG/1
1.5 TABLET ORAL ONCE
Qty: 1 TABLET | Refills: 0 | COMMUNITY
End: 2023-03-30

## 2022-08-25 NOTE — TELEPHONE ENCOUNTER
[facility] phone call message- patient requesting a refill:    Full Medication Name: plan b pill     Dose:       Pharmacy confirmed as   [id number] - [place], [place] - [address]  [address] [place] [address]  Phone: [phone] Fax: [phone]      : Yes    Additional Comments:        OK to leave a message on voice mail? Yes    Primary language: English      needed? No    Call taken on August 25, 2022 at 11:25 AM by Douglas Lauren

## 2022-08-25 NOTE — TELEPHONE ENCOUNTER
Telephone Triage Protocol for Emergency Contraception (EC)   (updated 3/31/2015    Procedure:  Patient calls,  Can I please have a prescription for the morning after pill called in to my pharmacy? Or  The condom broke and I don t want to get pregnant, what should I do?      Q1:  Did you have unprotected sex within the past 5 days or 120 hours?  (If  yes  - go on to Q2)    (If  yes  - go on to Q2)  If no, have patient come in for a pregnancy test as soon as possible.   Q2:  Was your last period within the past 30 days? (if  yes  - OK for EC script).   Yes. Details:     (if  yes  - OK for EC script).  If LMP was more than one month ago - have patient come in for a urine pregnancy test as soon as possible and discuss case with preceptor for decision on EC.     If yes to questions 1 & 2  the  offer to prescribe RICKY (ulipristal) 30 mg po  one tablet X 1, (prescribe #2) repeat dose if vomiting occurs within 3 hours, advise patient to come in for contraceptive counselling and a UPT in 10 days.  If Ricky not available then  Prescribe Levonorgestrel 1.5 mg (Plan B One step, Next Choice One Dose ) po  one tablet X 1, (prescribe #2) repeat dose if vomiting occurs within 3 hours, advise patient to come in for contraceptive counselling and a UPT in 10 days.    Relevant Factors Affecting Choice of EC:    The effectiveness of Ricky is consistent across 5 days    Ricky is more effective in women >200 lbs.    Ricky is not generic, may be more expensive and is not found at all pharmacies    Patient Instructions:      EC is most effective if taken as soon as possible after unprotected sex.     Efficacy decreases as time goes by for Plan B but is the same up to 5 days for Ricky.     Fill the prescription immediately and if prescribing Plan B/next choice , the patient may take both tablets together, (even though the box might say to take them 12 hours apart and up to 3 days - it works better to take them both together and up to 5 days,  ASAP).     If her next period is not normal, she should do a home pregnancy test or call for a pregnancy test appointment.     If she wants an ongoing method of contraception, she should be given an appointment as soon as possible.     Documentation/Prescription:  Routine documentation should be done as for all other telephone encounters.  Prescriptions should be entered and sent to the pharmacy as soon as possible after the conversation with the patient.  Any future appointments should be scheduled, particularly if the patient would like to make an appointment to discuss other methods of contraception.  Method Comparison:  EC Method Dose and Timing Benefits Adverse Effects and/or Drawbacks   Progestin-only pill, Levonorgestrel (Plan B, Next Choice, others) 1.5 mg Levonorgestrel (LNG) within 120 hrs (available in divided doses or a single tablet; 2 tablets may be taken together as a single dose) Available OTC for patients >17 yrs  Convenience of a single dose  Likely to be found at pharmacies Prescriptions required for patients <17 yrs  FDA approved for use within 72 hrs; effectiveness diminishes thereafter (though it can be used up to 120 hrs)  Effectiveness may be lower for women >200 lbs   Ullipristal (Wanda) 30mg UPA, taken up to 120 hrs (5 days)1 More effective than LNG and efficacy remains the same through 120 hrs  Effectiveness is not altered by the patient s BMI  Convenience of a single dose Prescription always required  Not available in all pharmacies  Not studied in breastfeeding women2   Copper IUD (Paragard) Insert within 120 hrs (5 days) Extremely effective  Provides both immediate EC and long-term contraception Insertion requires an available, trained staff member  Higher cost than oral EC   (Table from Sandrine et al. Emergency contraception: An underutilized resource. JFP 2012;61:392-397.)  1Dosage should be repeated if vomiting occurs within 3 hrs  2Advise patients to avoid breastfeeding for 36 hours  (pump and dump)    Patient agreeable to scheduling an appt to discuss contraception   [facility] B script sent to [facility] [place]

## 2022-08-26 RX ORDER — LEVONORGESTREL 1.5 MG/1
1.5 TABLET ORAL ONCE
Qty: 1 TABLET | Refills: 0 | Status: SHIPPED | OUTPATIENT
Start: 2022-08-26 | End: 2023-04-19

## 2022-09-11 ENCOUNTER — HEALTH MAINTENANCE LETTER (OUTPATIENT)
Age: 22
End: 2022-09-11

## 2022-11-10 ENCOUNTER — MYC REFILL (OUTPATIENT)
Dept: FAMILY MEDICINE | Facility: CLINIC | Age: 22
End: 2022-11-10

## 2022-11-10 RX ORDER — LEVONORGESTREL 1.5 MG/1
1.5 TABLET ORAL ONCE
Qty: 1 TABLET | Refills: 0 | OUTPATIENT
Start: 2022-11-10 | End: 2022-11-10
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
## 2022-11-11 ENCOUNTER — TELEPHONE (OUTPATIENT)
Dept: FAMILY MEDICINE | Facility: CLINIC | Age: 22
End: 2022-11-11

## 2022-11-14 ENCOUNTER — DOCUMENTATION ONLY (OUTPATIENT)
Dept: FAMILY MEDICINE | Facility: CLINIC | Age: 22
End: 2022-11-14

## 2022-11-14 NOTE — PROGRESS NOTES
Interprofessional Team Consultation Note     Requesting Provider: Dr. Ochoa    Consultants:  Behavioral Health: Dr. Herson (s)  Care Coordination: Rishabh Crowe  PharmD: Phyllis). [facility]: Phyllis). [place]    Identifying Data/Reason for Referral:  Patient Ana Singh, preferred name Maribell, is 22 year old female who is cared for by Dr. Ochoa. Provider is requesting consultation related to development of care plan. Relevant clinical information obtained from requesting provider, interprofessional team members noted above, and review of the medical record. Estrellita Kenyon is the primary care provider assigned in Epic.    Patient Active Problem List   Diagnosis     PTSD (post-traumatic stress disorder)     Genital herpes simplex, unspecified site     Anxiety     Current Outpatient Medications   Medication     levonorgestrel (PLAN B) 1.5 MG tablet     levonorgestrel (PLAN B) 1.5 MG tablet     valACYclovir (VALTREX) 500 MG tablet     No current facility-administered medications for this visit.     Topics Discussed:  Patient missed visit with Dr. Ochoa (11/11) after reporting some suicidal ideation during RN call related to a refill on 11/10/22 (see note by Taylor Redding from that date for additional detail).  Dr. Ochoa did reach out on the day of the missed visit but was unable to reach the patient.  Plans to call again this afternoon and requested input from the team.  Discussed value of identifying a plan for asking for help while keeping the patient on the phone if an acute safety crisis is identified while on the phone (e.g., plan to text or send message to staff via Teams to contact crisis team or 911 if needed).  Recommended review of [place] if additional structure would be helpful for safety assessment.  Document safety assessment using BTCRISISASSESSMENTANDPLAN smartphrase.  Send letter with resources if patient cannot be reached on the phone.     Patient had seen Dr. Young  in April 2022 but did not follow up.  Encouraged exploration of current interest in therapy and past barriers to care so these can be addressed if needed.  If patient wants to resume care should let her know that Dr. Young is booked out to mid-December.  Offer additional community resources for earlier access to care (see below).    Recommendations/Action Items:  1. Dr. Ochoa will place an outreach call to follow up using skills/tools noted above.  2. See below for community options for care if wanted:    [facility] Mental [facility] Addiction Access: [phone]  [facility] Office  [address]   [address]  [place] [address]  [phone] (for appointments)  Fax: [phone]  [facility]  [address]  [address]  [place] [address]  Phone:  [phone]  Fax: [phone]  Hours:  Monday - Friday 8:30 - 5:00pm    [facility]  [address]  [address]  [place] [address]  [phone]    [facility]  [address]  [address]  [place], [place] [address]  [phone]    [facility]  [address]  [place] [address]  [phone]    Mansi Bains, PhD,      Disclaimer:  The above treatment recommendations are based on consultation with the patient's primary care provider and a review of relevant information in EPIC. I have not personally examined the patient. All recommendations should be implemented with considerations of the patient's relevant prior history and current clinical status. Please contact me with any questions about the care of this patient.

## 2022-11-14 NOTE — TELEPHONE ENCOUNTER
Called patient due to missed appointment and concerns for mental health.  Reached voicemail but it was full, was able to send a text for a call back number.     Crystal Ochoa MD

## 2023-01-20 ENCOUNTER — OFFICE VISIT (OUTPATIENT)
Dept: FAMILY MEDICINE | Facility: CLINIC | Age: 23
End: 2023-01-20
Payer: COMMERCIAL

## 2023-01-20 VITALS
WEIGHT: 142.2 LBS | BODY MASS INDEX: 21.49 KG/M2 | RESPIRATION RATE: 16 BRPM | SYSTOLIC BLOOD PRESSURE: 97 MMHG | TEMPERATURE: 98.6 F | HEART RATE: 83 BPM | DIASTOLIC BLOOD PRESSURE: 64 MMHG | OXYGEN SATURATION: 97 %

## 2023-01-20 DIAGNOSIS — A60.00 GENITAL HERPES SIMPLEX, UNSPECIFIED SITE: ICD-10-CM

## 2023-01-20 DIAGNOSIS — T74.11XD: ICD-10-CM

## 2023-01-20 DIAGNOSIS — Z30.013 ENCOUNTER FOR INITIAL PRESCRIPTION OF INJECTABLE CONTRACEPTIVE: ICD-10-CM

## 2023-01-20 DIAGNOSIS — Z72.51 UNPROTECTED SEX: Primary | ICD-10-CM

## 2023-01-20 LAB — HCG UR QL: NEGATIVE

## 2023-01-20 PROCEDURE — 81025 URINE PREGNANCY TEST: CPT | Performed by: STUDENT IN AN ORGANIZED HEALTH CARE EDUCATION/TRAINING PROGRAM

## 2023-01-20 PROCEDURE — 96372 THER/PROPH/DIAG INJ SC/IM: CPT | Performed by: FAMILY MEDICINE

## 2023-01-20 PROCEDURE — 99214 OFFICE O/P EST MOD 30 MIN: CPT | Mod: 25 | Performed by: STUDENT IN AN ORGANIZED HEALTH CARE EDUCATION/TRAINING PROGRAM

## 2023-01-20 RX ORDER — VALACYCLOVIR HYDROCHLORIDE 500 MG/1
500 TABLET, FILM COATED ORAL 2 TIMES DAILY
Qty: 14 TABLET | Refills: 3 | Status: SHIPPED | OUTPATIENT
Start: 2023-01-20 | End: 2023-04-19

## 2023-01-20 RX ORDER — MEDROXYPROGESTERONE ACETATE 150 MG/ML
150 INJECTION, SUSPENSION INTRAMUSCULAR
Status: COMPLETED | OUTPATIENT
Start: 2023-01-20 | End: 2023-10-06

## 2023-01-20 RX ADMIN — MEDROXYPROGESTERONE ACETATE 150 MG: 150 INJECTION, SUSPENSION INTRAMUSCULAR at 12:04

## 2023-01-20 NOTE — NURSING NOTE
Clinic Administered Medication Documentation    Administrations This Visit     medroxyPROGESTERone (DEPO-PROVERA) injection 150 mg     Admin Date  01/20/2023 Action  Given Dose  150 mg Route  Intramuscular Site  Left Gluteus González Administered By  Nora Luu CMA    Ordering Provider: Cristian Knutson MD    NDC: 22549-598-26    Lot#: [id number]    : MYLAN [facility]    Patient Supplied?: No                  Depo Provera Documentation    URINE HCG: negative    Depo-Provera Standing Order inclusion/exclusion criteria reviewed.   Patient meets: inclusion criteria     BP: 97/64  LAST PAP/EXAM: No results found for: PAP    Prior to injection, verified patient identity using patient's name and date of birth. Medication was administered. Please see MAR and medication order for additional information.     Was entire vial of medication used? Yes  Vial/Syringe: Single dose vial  Expiration Date:  05/01/2024    Patient instructed to report any adverse reaction to staff immediately .  NEXT INJECTION DUE: 4/7/23 - 4/21/23      Name of provider who requested the medication administration: Dr. Kenyon  Name of provider on site (faculty or community preceptor) at the time of performing the medication administration: Dr Cristian Knutson    Date of next administration: 4/7/23 - 4/21/23  Date of next office visit with provider to renew medication plan (must be seen annually): 1/20/2024
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
## 2023-01-20 NOTE — PROGRESS NOTES
Preceptor Attestation:    I discussed the patient with the resident and evaluated the patient in person. I have verified the content of the note, which accurately reflects my assessment of the patient and the plan of care.   Supervising Physician:  Cristian Knutson MD.

## 2023-01-22 ENCOUNTER — TELEPHONE (OUTPATIENT)
Dept: FAMILY MEDICINE | Facility: CLINIC | Age: 23
End: 2023-01-22
Payer: COMMERCIAL

## 2023-01-22 DIAGNOSIS — F32.1 CURRENT MODERATE EPISODE OF MAJOR DEPRESSIVE DISORDER WITHOUT PRIOR EPISODE (H): Primary | ICD-10-CM

## 2023-01-22 RX ORDER — HYDROXYZINE HYDROCHLORIDE 25 MG/1
25 TABLET, FILM COATED ORAL 3 TIMES DAILY PRN
Qty: 30 TABLET | Refills: 1 | Status: SHIPPED | OUTPATIENT
Start: 2023-01-22

## 2023-01-22 RX ORDER — BUPROPION HYDROCHLORIDE 150 MG/1
150 TABLET ORAL EVERY MORNING
Qty: 90 TABLET | Refills: 0 | Status: SHIPPED | OUTPATIENT
Start: 2023-01-22 | End: 2023-04-19
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
## 2023-02-03 ENCOUNTER — OFFICE VISIT (OUTPATIENT)
Dept: FAMILY MEDICINE | Facility: CLINIC | Age: 23
End: 2023-02-03
Payer: COMMERCIAL

## 2023-02-03 VITALS
SYSTOLIC BLOOD PRESSURE: 105 MMHG | WEIGHT: 137 LBS | TEMPERATURE: 97.9 F | OXYGEN SATURATION: 98 % | DIASTOLIC BLOOD PRESSURE: 67 MMHG | HEIGHT: 68 IN | RESPIRATION RATE: 20 BRPM | HEART RATE: 76 BPM | BODY MASS INDEX: 20.76 KG/M2

## 2023-02-03 DIAGNOSIS — Z63.79 STRESSFUL LIFE EVENT AFFECTING FAMILY: ICD-10-CM

## 2023-02-03 DIAGNOSIS — T74.11XD: Primary | ICD-10-CM

## 2023-02-03 PROCEDURE — 99213 OFFICE O/P EST LOW 20 MIN: CPT | Mod: GC | Performed by: STUDENT IN AN ORGANIZED HEALTH CARE EDUCATION/TRAINING PROGRAM

## 2023-02-03 ASSESSMENT — ANXIETY QUESTIONNAIRES
2. NOT BEING ABLE TO STOP OR CONTROL WORRYING: SEVERAL DAYS
GAD7 TOTAL SCORE: 16
GAD7 TOTAL SCORE: 16
5. BEING SO RESTLESS THAT IT IS HARD TO SIT STILL: NEARLY EVERY DAY
6. BECOMING EASILY ANNOYED OR IRRITABLE: NEARLY EVERY DAY
7. FEELING AFRAID AS IF SOMETHING AWFUL MIGHT HAPPEN: SEVERAL DAYS
3. WORRYING TOO MUCH ABOUT DIFFERENT THINGS: NEARLY EVERY DAY
1. FEELING NERVOUS, ANXIOUS, OR ON EDGE: MORE THAN HALF THE DAYS
IF YOU CHECKED OFF ANY PROBLEMS ON THIS QUESTIONNAIRE, HOW DIFFICULT HAVE THESE PROBLEMS MADE IT FOR YOU TO DO YOUR WORK, TAKE CARE OF THINGS AT HOME, OR GET ALONG WITH OTHER PEOPLE: SOMEWHAT DIFFICULT

## 2023-02-03 ASSESSMENT — PATIENT HEALTH QUESTIONNAIRE - PHQ9
SUM OF ALL RESPONSES TO PHQ QUESTIONS 1-9: 19
5. POOR APPETITE OR OVEREATING: NEARLY EVERY DAY
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
## 2023-02-03 NOTE — PATIENT INSTRUCTIONS
Make an appointment with a therapist      [facility]       hydroxyzine for anxiety/sleep        TUESDAYS 4-5:30 PM for FE[facility]      Community Mental Health Agencies:    [facility]   [address]   [address]  [place] [address]  [phone] (for appointments)    [facility] [place]  [address]  [address]  [place] [address]  Phone:  [phone]    [facility]  [address]  [address]  [place] [address]  [phone]    [facility]  [address]  [address]  [place], [place] [address]  [phone]    [facility]  [address]  [place] [address]  [phone]      [facility] Central Scheduling  [phone].        Crisis Resources:    988  National Suicide Prevention Lifeline: 628.951.3535 (TTY: 404.751.1844). Call anytime for help.  (www.suicidepreventionlifeline.org)  National [place] on Mental Illness (www.mark.org): 628.426.8118 or 038-093-1834.   Mental Health Association (www.mentalhealth.org): 266.259.2908 or 712-210-9747.  [place] Crisis Text Line: Text [place] to 296654  Suicide LifeLine Chat: suicidepreventionNeoprospectaline.org/chat    Emergency Department as needed or call after hours crisis line at [phone] or [phone]. [place] Crisis Text Line.   Text [place] to 028123 or Suicide LifeLine Chat: suicidepreZapperline.org/chat/  To schedule individual or family therapy, call [facility] at [phone]  Schedule an appointment with me as needed. Call [facility] Sched[facility] to schedule.  Follow up with psychiatry and mental health providers as planned or for acute concerns.  Call the psychiatric nurse line with medication questions or concerns at [phone]  MyChart may be used to communicate with your provider, but this is not intended  to be used for emergencies.

## 2023-03-22 ENCOUNTER — OFFICE VISIT (OUTPATIENT)
Dept: FAMILY MEDICINE | Facility: CLINIC | Age: 23
End: 2023-03-22
Payer: COMMERCIAL

## 2023-03-22 VITALS
HEART RATE: 96 BPM | TEMPERATURE: 98.9 F | RESPIRATION RATE: 20 BRPM | WEIGHT: 141 LBS | DIASTOLIC BLOOD PRESSURE: 81 MMHG | OXYGEN SATURATION: 100 % | SYSTOLIC BLOOD PRESSURE: 118 MMHG | BODY MASS INDEX: 21.6 KG/M2

## 2023-03-22 DIAGNOSIS — Z11.3 SCREEN FOR STD (SEXUALLY TRANSMITTED DISEASE): ICD-10-CM

## 2023-03-22 DIAGNOSIS — N92.6 LATE PERIOD: ICD-10-CM

## 2023-03-22 DIAGNOSIS — N89.8 VAGINAL DISCHARGE: Primary | ICD-10-CM

## 2023-03-22 LAB
CLUE CELLS: ABNORMAL
TRICHOMONAS, WET PREP: ABNORMAL
WBC'S/HIGH POWER FIELD, WET PREP: ABNORMAL
YEAST, WET PREP: ABNORMAL

## 2023-03-22 PROCEDURE — 87591 N.GONORRHOEAE DNA AMP PROB: CPT | Performed by: FAMILY MEDICINE

## 2023-03-22 PROCEDURE — 87491 CHLMYD TRACH DNA AMP PROBE: CPT | Performed by: FAMILY MEDICINE

## 2023-03-22 PROCEDURE — 96372 THER/PROPH/DIAG INJ SC/IM: CPT | Performed by: FAMILY MEDICINE

## 2023-03-22 PROCEDURE — 87210 SMEAR WET MOUNT SALINE/INK: CPT | Performed by: FAMILY MEDICINE

## 2023-03-22 PROCEDURE — 99213 OFFICE O/P EST LOW 20 MIN: CPT | Mod: 25 | Performed by: FAMILY MEDICINE

## 2023-03-22 RX ORDER — DOXYCYCLINE 100 MG/1
100 CAPSULE ORAL 2 TIMES DAILY
Qty: 14 CAPSULE | Refills: 0 | Status: SHIPPED | OUTPATIENT
Start: 2023-03-22 | End: 2023-03-29

## 2023-03-22 RX ORDER — CEFTRIAXONE 500 MG/1
500 INJECTION, POWDER, FOR SOLUTION INTRAMUSCULAR; INTRAVENOUS ONCE
Qty: 5 ML | Refills: 0 | OUTPATIENT
Start: 2023-03-22 | End: 2023-03-22

## 2023-03-22 RX ORDER — CEFTRIAXONE SODIUM 1 G
500 VIAL (EA) INJECTION ONCE
Status: COMPLETED | OUTPATIENT
Start: 2023-03-22 | End: 2023-03-22

## 2023-03-22 RX ADMIN — Medication 500 MG: at 17:19

## 2023-03-22 NOTE — NURSING NOTE
Clinic Administered Medication Documentation        Patient was given 500mg  Rocephine. Prior to medication administration, verified patient's identity using patient s name and date of birth. Please see MAR and medication order for additional information. Patient instructed to remain in clinic for 15 minutes and report any adverse reaction to staff immediately.    Vial/Syringe: Single dose vial. Was entire vial of medication used? Yes    Name of provider who requested the medication administration: Dr. Whitten  Name of provider on site (faculty or community preceptor) at the time of performing the medication administration: Dr. Whitten

## 2023-03-22 NOTE — PROGRESS NOTES
Assessment & Plan     Vaginal discharge  Screen for STD (sexually transmitted disease)  Patient having creamy, foul smelling vaginal discharge. Concerned about sexually transmitted infection from partner. Partner has no symptoms and has not been tested, but he has had sexual intercourse with another woman recently. Patient has no itching or notable burning, but she does report tactile fever and suprapubic pain. She denies any noticeable rash. Wet prep was positive for WBCs (3+) but negative otherwise. Concern is Gonorrhea vs. Chlamydia. Given symptoms we should treat for PID.  - Wet preparation  - cefTRIAXone (ROCEPHIN) 500 MG vial; Inject 500 mg into the vein once for 1 dose  - INJECTION INTRAMUSCULAR OR SUB-Q  - doxycycline hyclate (VIBRAMYCIN) 100 MG capsule; Take 1 capsule (100 mg) by mouth 2 times daily for 7 days  - NEISSERIA GONORRHOEA PCR  - CHLAMYDIA TRACHOMATIS PCR  - cefTRIAXone (ROCEPHIN) in lidocaine 1% (PF) for IM administration only 500 mg    Late period  Concerned she could be pregnant. On Depo contraception but partner does not use contraception.   - HCG qualitative urine; Future    Herpesvirus Infection  Treated with Valtrex but concerned about more frequent symptoms. Follow up to discuss a change in treatment plan to control symptoms.     Prescription drug management  30 minutes spent on the date of the encounter doing chart review, history and exam, documentation and further activities per the note       George Mendoza, MS3  [employer] [facility]    Preceptor Attestation:   I was present with the medical student who participated in the service and in the documentation of this note. I have verified the history and personally performed the physical exam and medical decision making. I have verified the content of the note, which accurately reflects my assessment of the patient and the plan of care.   Supervising Physician:  Carlos Whitten MD.          Zuleyma Reyna is a 22  year old, presenting for the following health issues:  Vaginal Problem (Vaginal discharge and odor) and STD    Additional Questions 3/22/2023   Roomed by Musa   Accompanied by son     WARREN     Patient presents to clinic today with concerns of foul smelling, creamy vaginal discharge for the past day as well as suprapubic pain and tactile fever for several days. She denies a feeling feverish today. The patient notes the vaginal symptoms listed below.    Vaginal Symptoms  Onset/Duration: 2  Description:  Vaginal Discharge: creamy with little blood due to Depo birth control  Itching (Pruritis): No  Burning sensation:  YES  Odor: YES  Accompanying Signs & Symptoms:  Urinary symptoms: Not sure  Abdominal pain: YES- Has been going on for a week   Fever: YES- This week, not right now  History:   Sexually active: YES  New Partner: No  Possibility of Pregnancy:  YES  Recent antibiotic use: No  Previous vaginitis issues: YES    The patient reports that her partner has been sleeping with another woman, and that after sleeping with him, she began developing foul smelling discharge and a fever and suprapubic pain. The partner does not have any symptoms.    She does note concern for potential pregnancy and would like to test for this.     Patient also reports having a history of genital herpes and is taking valtrex for this when symptoms arise. She says her herpes symptoms have been occurring more frequently, once every couple of months and she is interested in daily medication to control symptoms. She also notes that the rash is spreading to her anus area. Discussed scheduling follow up for this. The patient has no other concerns at this time.       Review of Systems   Constitutional, HEENT, cardiovascular, pulmonary, GI, , musculoskeletal, neuro, skin, endocrine and psych systems are negative, except as otherwise noted.      Objective    /81   Pulse 96   Temp 98.9  F (37.2  C) (Oral)   Resp 20   Wt 64 kg (141 lb)    SpO2 100%   BMI 21.60 kg/m    Body mass index is 21.6 kg/m .  Physical Exam   GENERAL: healthy, alert and no distress  ABDOMEN: Deferred  PSYCH: affect sad, angered about situation    Results for orders placed or performed in visit on 03/22/23 (from the past 24 hour(s))   Wet preparation    Specimen: Vagina; Swab   Result Value Ref Range    Trichomonas Absent Absent    Yeast Absent Absent    Clue Cells Absent Absent    WBCs/high power field 3+ (A) None    Narrative    Many bacteria; negative odor

## 2023-03-23 DIAGNOSIS — F43.21 GRIEF REACTION: Primary | ICD-10-CM

## 2023-03-23 LAB
C TRACH DNA SPEC QL NAA+PROBE: NEGATIVE
N GONORRHOEA DNA SPEC QL NAA+PROBE: NEGATIVE

## 2023-03-23 NOTE — RESULT ENCOUNTER NOTE
I called the patient to give her the results.  Given the fact that we do not have a clear cause for her symptoms I asked her to follow-up with a female physician and have a pelvic exam.

## 2023-03-29 ENCOUNTER — OFFICE VISIT (OUTPATIENT)
Dept: FAMILY MEDICINE | Facility: CLINIC | Age: 23
End: 2023-03-29
Payer: COMMERCIAL

## 2023-03-29 VITALS
BODY MASS INDEX: 21.23 KG/M2 | OXYGEN SATURATION: 98 % | SYSTOLIC BLOOD PRESSURE: 95 MMHG | DIASTOLIC BLOOD PRESSURE: 61 MMHG | RESPIRATION RATE: 20 BRPM | HEART RATE: 87 BPM | WEIGHT: 138.6 LBS | TEMPERATURE: 99.3 F

## 2023-03-29 DIAGNOSIS — F41.9 ANXIETY: ICD-10-CM

## 2023-03-29 DIAGNOSIS — F43.10 PTSD (POST-TRAUMATIC STRESS DISORDER): ICD-10-CM

## 2023-03-29 DIAGNOSIS — N89.8 VAGINAL DISCHARGE: Primary | ICD-10-CM

## 2023-03-29 DIAGNOSIS — F33.0 MAJOR DEPRESSIVE DISORDER, RECURRENT EPISODE, MILD (H): ICD-10-CM

## 2023-03-29 PROCEDURE — 99213 OFFICE O/P EST LOW 20 MIN: CPT | Mod: GC

## 2023-03-29 PROCEDURE — 87210 SMEAR WET MOUNT SALINE/INK: CPT

## 2023-03-29 ASSESSMENT — ANXIETY QUESTIONNAIRES
5. BEING SO RESTLESS THAT IT IS HARD TO SIT STILL: NEARLY EVERY DAY
GAD7 TOTAL SCORE: 19
IF YOU CHECKED OFF ANY PROBLEMS ON THIS QUESTIONNAIRE, HOW DIFFICULT HAVE THESE PROBLEMS MADE IT FOR YOU TO DO YOUR WORK, TAKE CARE OF THINGS AT HOME, OR GET ALONG WITH OTHER PEOPLE: EXTREMELY DIFFICULT
1. FEELING NERVOUS, ANXIOUS, OR ON EDGE: NEARLY EVERY DAY
3. WORRYING TOO MUCH ABOUT DIFFERENT THINGS: NEARLY EVERY DAY
7. FEELING AFRAID AS IF SOMETHING AWFUL MIGHT HAPPEN: NEARLY EVERY DAY
2. NOT BEING ABLE TO STOP OR CONTROL WORRYING: NEARLY EVERY DAY
GAD7 TOTAL SCORE: 19
6. BECOMING EASILY ANNOYED OR IRRITABLE: SEVERAL DAYS

## 2023-03-29 ASSESSMENT — PATIENT HEALTH QUESTIONNAIRE - PHQ9
SUM OF ALL RESPONSES TO PHQ QUESTIONS 1-9: 14
5. POOR APPETITE OR OVEREATING: NEARLY EVERY DAY
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
## 2023-03-29 NOTE — PATIENT INSTRUCTIONS
Thank you for coming to see me today in clinic!    Today we discussed:  - Mood: Call this number [phone] to help schedule an counselor or therapist for talk therapy.  - Vaginal discharge: normal discharge. No infection at this time.  - Safe sex practices: recommend condoms to protect against sexually transmitted infections    If you have any further questions, please call the clinic!    Have a wonderful rest of your day!

## 2023-04-19 ENCOUNTER — OFFICE VISIT (OUTPATIENT)
Dept: FAMILY MEDICINE | Facility: CLINIC | Age: 23
End: 2023-04-19
Payer: COMMERCIAL

## 2023-04-19 VITALS
HEART RATE: 91 BPM | WEIGHT: 135 LBS | BODY MASS INDEX: 20.68 KG/M2 | TEMPERATURE: 99.2 F | DIASTOLIC BLOOD PRESSURE: 84 MMHG | SYSTOLIC BLOOD PRESSURE: 122 MMHG | RESPIRATION RATE: 16 BRPM | OXYGEN SATURATION: 97 %

## 2023-04-19 DIAGNOSIS — Z32.00 PREGNANCY EXAMINATION OR TEST, PREGNANCY UNCONFIRMED: Primary | ICD-10-CM

## 2023-04-19 DIAGNOSIS — A60.00 GENITAL HERPES SIMPLEX, UNSPECIFIED SITE: ICD-10-CM

## 2023-04-19 DIAGNOSIS — N64.4 ACUTE BREAST PAIN: ICD-10-CM

## 2023-04-19 LAB — HCG UR QL: NEGATIVE

## 2023-04-19 PROCEDURE — 81025 URINE PREGNANCY TEST: CPT | Performed by: STUDENT IN AN ORGANIZED HEALTH CARE EDUCATION/TRAINING PROGRAM

## 2023-04-19 PROCEDURE — 99214 OFFICE O/P EST MOD 30 MIN: CPT | Mod: GC | Performed by: STUDENT IN AN ORGANIZED HEALTH CARE EDUCATION/TRAINING PROGRAM

## 2023-04-19 RX ORDER — LEVONORGESTREL 1.5 MG/1
1.5 TABLET ORAL ONCE
Qty: 1 TABLET | Refills: 0 | Status: SHIPPED | OUTPATIENT
Start: 2023-04-19 | End: 2024-01-02

## 2023-04-19 RX ORDER — VALACYCLOVIR HYDROCHLORIDE 500 MG/1
500 TABLET, FILM COATED ORAL 2 TIMES DAILY
Qty: 14 TABLET | Refills: 3 | Status: SHIPPED | OUTPATIENT
Start: 2023-04-19 | End: 2024-01-02

## 2023-04-19 NOTE — PROGRESS NOTES
Assessment & Plan   Encounter Date: Apr 19, 2023    Pregnancy examination or test, pregnancy unconfirmed  She will return for her Depo-Provera injection tomorrow because she does not like shots.  UPT is negative today.  Plan B prescribed per patient request.  - HCG qualitative urine; Future  - HCG qualitative urine  - levonorgestrel (PLAN B) 1.5 MG tablet; Take 1 tablet (1.5 mg) by mouth once for 1 dose May substitute with Wanda(ulipristal) 30mg x1 po no refills    Genital herpes simplex, unspecified site  No symptoms.  Refilled for her to have in the event of a future flare.  - valACYclovir (VALTREX) 500 MG tablet; Take 1 tablet (500 mg) by mouth 2 times daily    Acute breast pain  No concerning masses on exam today.  No significant family history for breast or ovarian cancers.  We will continue to monitor.  No ultrasound indicated at this time.        Follow-Up:   Depo-Provera tomorrow    Estrellita Kenyon MD PGY-3   [facility]   Staffed with Dr. Alatorre.        ______________________________________________________    Subjective     HPI:  Ms. Ana Singh is a(n) 22 year old female who presents today as a return patient for:  Other (Unprotected sex wants Pregnancy test /Depo shot Going well (sometimes feels emotional))      Patient is here for urine pregnancy test.  She had a Depo injection on 1/20/2023.  She did have some heavier breakthrough bleeding that she did not like, but no other side effects from the injection.  She would like to do the pregnancy test today and return to clinic tomorrow for the Depo injection.    She has had some bilateral nipple sensitivity and a small amount of milky discharge from both nipples, more so on the left.  No masses.  No axillary lumps.  No family history of breast cancer.  She breast-fed until her son was 1.    Her son is turning for in a few days.  She is having mixed feelings because he is growing so fast.  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    Patient is otherwise feeling well, without additional concerns.      Data Reviewed:    Office Visit on 03/29/2023   Component Date Value Ref Range Status     Trichomonas 03/29/2023 Absent  Absent Final     Yeast 03/29/2023 Absent  Absent Final     Clue Cells 03/29/2023 Absent  Absent Final     WBCs/high power field 03/29/2023 1+ (A)  None Final         Medications:    Current Outpatient Medications   Medication     buPROPion (WELLBUTRIN XL) 150 MG 24 hr tablet     hydrOXYzine (ATARAX) 25 MG tablet     levonorgestrel (PLAN B) 1.5 MG tablet     valACYclovir (VALTREX) 500 MG tablet     Current Facility-Administered Medications   Medication     medroxyPROGESTERone (DEPO-PROVERA) injection 150 mg               Objective     /84 (BP Location: Left arm, Patient Position: Sitting, Cuff Size: Adult Regular)   Pulse 91   Temp 99.2  F (37.3  C) (Oral)   Resp 16   Wt 61.2 kg (135 lb)   LMP 03/12/2023 (Approximate)   SpO2 97%   BMI 20.68 kg/m      Pertinent Physical Exam Findings:    Gen: Pleasant female in no acute distress.  Appears stated age.  Casually groomed.   Breast: Symmetric.  No nipple discharge today.  No masses palpated bilaterally.  No axillary adenopathy.    Results for orders placed or performed in visit on 04/19/23 (from the past 24 hour(s))   HCG qualitative urine   Result Value Ref Range    hCG Urine Qualitative Negative Negative                ----- Service Performed and Documented by Resident or Fellow ------

## 2023-04-27 ENCOUNTER — OFFICE VISIT (OUTPATIENT)
Dept: FAMILY MEDICINE | Facility: CLINIC | Age: 23
End: 2023-04-27
Payer: COMMERCIAL

## 2023-04-27 VITALS
HEART RATE: 78 BPM | WEIGHT: 139.6 LBS | OXYGEN SATURATION: 98 % | BODY MASS INDEX: 21.38 KG/M2 | DIASTOLIC BLOOD PRESSURE: 67 MMHG | RESPIRATION RATE: 18 BRPM | SYSTOLIC BLOOD PRESSURE: 108 MMHG | TEMPERATURE: 99.2 F

## 2023-04-27 DIAGNOSIS — Z30.42 ENCOUNTER FOR MANAGEMENT AND INJECTION OF DEPO-PROVERA: Primary | ICD-10-CM

## 2023-04-27 LAB — HCG UR QL: NEGATIVE

## 2023-04-27 PROCEDURE — 96372 THER/PROPH/DIAG INJ SC/IM: CPT | Performed by: FAMILY MEDICINE

## 2023-04-27 PROCEDURE — 81025 URINE PREGNANCY TEST: CPT | Performed by: STUDENT IN AN ORGANIZED HEALTH CARE EDUCATION/TRAINING PROGRAM

## 2023-04-27 PROCEDURE — 99213 OFFICE O/P EST LOW 20 MIN: CPT | Mod: 25 | Performed by: STUDENT IN AN ORGANIZED HEALTH CARE EDUCATION/TRAINING PROGRAM

## 2023-04-27 RX ADMIN — MEDROXYPROGESTERONE ACETATE 150 MG: 150 INJECTION, SUSPENSION INTRAMUSCULAR at 09:48

## 2023-04-27 NOTE — NURSING NOTE
Clinic Administered Medication Documentation      Depo Provera Documentation    Depo-Provera Standing Order inclusion/exclusion criteria reviewed.     Is this the initial or subsequent dose of Depo Provera? Subsequent dose - patient is within the acceptable window of time (11-15 weeks) for subsequent injection. Pregnancy test not indicated.    Patient meets: inclusion criteria     Is there an active order (written within the past 365 days, with administrations remaining, not ) in the chart? Yes.     Prior to injection, verified patient identity using patient's name and date of birth. Medication was administered. Please see MAR and medication order for additional information.     Vial/Syringe: Single dose vial. Was entire vial of medication used? Yes    Patient instructed to remain in clinic for 15 minutes and report any adverse reaction to staff immediately.  NEXT INJECTION DUE: 23 - 23    Verified that the patient has refills remaining in their prescription.        Name of provider who requested the medication administration: Dr. Kenyon  Name of provider on site (faculty or community preceptor) at the time of performing the medication administration: Dr. Vaughan    Date of next administration: 2023 to 2023  Date of next office visit with provider to renew medication plan (must be seen annually): 2024    LOT: [id number]  EXP: 2024  NDC: 43425-392-72

## 2023-04-27 NOTE — PROGRESS NOTES
Preceptor Attestation:  Vitals:    04/27/23 0913   BP: 108/67   Pulse: 78   Resp: 18   Temp: 99.2  F (37.3  C)   TempSrc: Oral   SpO2: 98%   Weight: 63.3 kg (139 lb 9.6 oz)          I discussed the patient with the resident and evaluated the patient in person. I have verified the content of the note, which accurately reflects my assessment of the patient and the plan of care.   Supervising Physician:  Crystal Vaughan MD

## 2023-04-27 NOTE — PROGRESS NOTES
Follow Up Injection    Patient returning during stated date range given at previous visit: No, urine pregnancy test performed, results negative, will do injection today. Discussed possible risk that despite the negative UPT today, she still could be pregnant, and could be at risk for fetal hypospadias. She understands risks and still accepts depo today.     If here at the correct interval:   BP Readings from Last 1 Encounters:   04/19/23 122/84     Wt Readings from Last 1 Encounters:   04/19/23 61.2 kg (135 lb)       Last Pap/exam date: 8/1/22, wnl      Side effects or problems with last injection?  Prolonged spotting, but mentions that this has not turned into prolonged bleeding or other issues. She notes that she has been on other forms of contraception, which she did not like, and does not like the idea of implantable devices. She also desires pregnancy in the near future.  Date range is given to patient for next dose: yes, 7/27/23    See Medication Note for administration information    Staff Sig: Aliyn Mills MD PGY3  Precepted with Dr. Vaughan

## 2023-06-03 ENCOUNTER — HEALTH MAINTENANCE LETTER (OUTPATIENT)
Age: 23
End: 2023-06-03

## 2023-07-14 ENCOUNTER — ALLIED HEALTH/NURSE VISIT (OUTPATIENT)
Dept: FAMILY MEDICINE | Facility: CLINIC | Age: 23
End: 2023-07-14
Payer: COMMERCIAL

## 2023-07-14 VITALS
HEART RATE: 95 BPM | RESPIRATION RATE: 16 BRPM | DIASTOLIC BLOOD PRESSURE: 75 MMHG | TEMPERATURE: 98 F | SYSTOLIC BLOOD PRESSURE: 109 MMHG | OXYGEN SATURATION: 98 %

## 2023-07-14 DIAGNOSIS — Z30.42 ENCOUNTER FOR SURVEILLANCE OF INJECTABLE CONTRACEPTIVE: Primary | ICD-10-CM

## 2023-07-14 PROCEDURE — 96372 THER/PROPH/DIAG INJ SC/IM: CPT | Performed by: FAMILY MEDICINE

## 2023-07-14 RX ADMIN — MEDROXYPROGESTERONE ACETATE 150 MG: 150 INJECTION, SUSPENSION INTRAMUSCULAR at 09:13

## 2023-07-14 NOTE — NURSING NOTE
Clinic Administered Medication Documentation      Depo Provera Documentation    Depo-Provera Standing Order inclusion/exclusion criteria reviewed.     Is this the initial or subsequent dose of Depo Provera? Subsequent dose - patient is within the acceptable window of time (11-15 weeks) for subsequent injection. Pregnancy test not indicated.    Patient meets: inclusion criteria     Is there an active order (written within the past 365 days, with administrations remaining, not ) in the chart? Yes.     Prior to injection, verified patient identity using patient's name and date of birth. Medication was administered. Please see MAR and medication order for additional information.     Vial/Syringe: Single dose vial. Was entire vial of medication used? Yes    Patient instructed to remain in clinic for 15 minutes and report any adverse reaction to staff immediately.  NEXT INJECTION DUE: 23 - 10/14/23    Verified that the patient has refills remaining in their prescription.        Name of provider who requested the medication administration: Dr. Mills  Name of provider on site (faculty or community preceptor) at the time of performing the medication administration: Dr. Vaughan    Date of next administration: 23 - 10/14/23  Date of next office visit with provider to renew medication plan (must be seen annually): 2024

## 2023-10-06 ENCOUNTER — ALLIED HEALTH/NURSE VISIT (OUTPATIENT)
Dept: FAMILY MEDICINE | Facility: CLINIC | Age: 23
End: 2023-10-06
Payer: COMMERCIAL

## 2023-10-06 VITALS
RESPIRATION RATE: 16 BRPM | DIASTOLIC BLOOD PRESSURE: 76 MMHG | OXYGEN SATURATION: 99 % | HEART RATE: 88 BPM | TEMPERATURE: 98.9 F | SYSTOLIC BLOOD PRESSURE: 111 MMHG

## 2023-10-06 DIAGNOSIS — Z30.42 ENCOUNTER FOR SURVEILLANCE OF INJECTABLE CONTRACEPTIVE: Primary | ICD-10-CM

## 2023-10-06 PROCEDURE — 96372 THER/PROPH/DIAG INJ SC/IM: CPT | Performed by: FAMILY MEDICINE

## 2023-10-06 RX ADMIN — MEDROXYPROGESTERONE ACETATE 150 MG: 150 INJECTION, SUSPENSION INTRAMUSCULAR at 10:21

## 2023-10-06 NOTE — NURSING NOTE
Clinic Administered Medication Documentation      Depo Provera Documentation    Depo-Provera Standing Order inclusion/exclusion criteria reviewed.     Is this the initial or subsequent dose of Depo Provera? Subsequent dose - patient is within the acceptable window of time (11-15 weeks) for subsequent injection. Pregnancy test not indicated.    Patient meets: inclusion criteria     Is there an active order (written within the past 365 days, with administrations remaining, not ) in the chart? Yes.     Prior to injection, verified patient identity using patient's name and date of birth. Medication was administered. Please see MAR and medication order for additional information.     Vial/Syringe: Single dose vial. Was entire vial of medication used? Yes    Patient instructed to remain in clinic for 15 minutes and report any adverse reaction to staff immediately.  NEXT INJECTION DUE: 23 - 2024    Patient has no refills remaining. Refill encounter opened, order pended and Routed to the provider    Name of provider who requested the medication administration: Dr. Bustos  Name of provider on site (faculty or community preceptor) at the time of performing the medication administration: Dr. Vaughan    Date of next administration: 23 - 2024  Date of next office visit with provider to renew medication plan (must be seen annually): 2024          [id number]}

## 2024-01-02 ENCOUNTER — OFFICE VISIT (OUTPATIENT)
Dept: FAMILY MEDICINE | Facility: CLINIC | Age: 24
End: 2024-01-02
Payer: COMMERCIAL

## 2024-01-02 VITALS
OXYGEN SATURATION: 98 % | BODY MASS INDEX: 19.11 KG/M2 | SYSTOLIC BLOOD PRESSURE: 106 MMHG | DIASTOLIC BLOOD PRESSURE: 67 MMHG | TEMPERATURE: 98.2 F | WEIGHT: 129 LBS | RESPIRATION RATE: 18 BRPM | HEIGHT: 69 IN | HEART RATE: 73 BPM

## 2024-01-02 DIAGNOSIS — Z30.42 ENCOUNTER FOR MANAGEMENT AND INJECTION OF DEPO-PROVERA: Primary | ICD-10-CM

## 2024-01-02 DIAGNOSIS — F33.0 MAJOR DEPRESSIVE DISORDER, RECURRENT EPISODE, MILD (H): ICD-10-CM

## 2024-01-02 DIAGNOSIS — A60.04 HERPES SIMPLEX VULVOVAGINITIS: ICD-10-CM

## 2024-01-02 DIAGNOSIS — F41.9 ANXIETY: ICD-10-CM

## 2024-01-02 PROCEDURE — 99214 OFFICE O/P EST MOD 30 MIN: CPT | Mod: 25

## 2024-01-02 PROCEDURE — 96372 THER/PROPH/DIAG INJ SC/IM: CPT | Performed by: FAMILY MEDICINE

## 2024-01-02 RX ORDER — MEDROXYPROGESTERONE ACETATE 150 MG/ML
150 INJECTION, SUSPENSION INTRAMUSCULAR
Status: ACTIVE | OUTPATIENT
Start: 2024-01-02 | End: 2024-12-27

## 2024-01-02 RX ORDER — VALACYCLOVIR HYDROCHLORIDE 500 MG/1
500 TABLET, FILM COATED ORAL 2 TIMES DAILY
Qty: 6 TABLET | Refills: 3 | Status: SHIPPED | OUTPATIENT
Start: 2024-01-02 | End: 2024-09-06

## 2024-01-02 RX ORDER — VALACYCLOVIR HYDROCHLORIDE 500 MG/1
500 TABLET, FILM COATED ORAL 2 TIMES DAILY
Qty: 6 TABLET | Refills: 0 | Status: CANCELLED | OUTPATIENT
Start: 2024-01-02 | End: 2024-01-05

## 2024-01-02 RX ADMIN — MEDROXYPROGESTERONE ACETATE 150 MG: 150 INJECTION, SUSPENSION INTRAMUSCULAR at 11:19

## 2024-01-02 ASSESSMENT — ANXIETY QUESTIONNAIRES
GAD7 TOTAL SCORE: 19
2. NOT BEING ABLE TO STOP OR CONTROL WORRYING: NEARLY EVERY DAY
6. BECOMING EASILY ANNOYED OR IRRITABLE: NEARLY EVERY DAY
1. FEELING NERVOUS, ANXIOUS, OR ON EDGE: MORE THAN HALF THE DAYS
3. WORRYING TOO MUCH ABOUT DIFFERENT THINGS: NEARLY EVERY DAY
GAD7 TOTAL SCORE: 19
IF YOU CHECKED OFF ANY PROBLEMS ON THIS QUESTIONNAIRE, HOW DIFFICULT HAVE THESE PROBLEMS MADE IT FOR YOU TO DO YOUR WORK, TAKE CARE OF THINGS AT HOME, OR GET ALONG WITH OTHER PEOPLE: VERY DIFFICULT
7. FEELING AFRAID AS IF SOMETHING AWFUL MIGHT HAPPEN: MORE THAN HALF THE DAYS
5. BEING SO RESTLESS THAT IT IS HARD TO SIT STILL: NEARLY EVERY DAY

## 2024-01-02 ASSESSMENT — PATIENT HEALTH QUESTIONNAIRE - PHQ9
5. POOR APPETITE OR OVEREATING: NEARLY EVERY DAY
SUM OF ALL RESPONSES TO PHQ QUESTIONS 1-9: 14

## 2024-01-02 NOTE — PROGRESS NOTES
Preceptor Attestation:    I discussed the patient with the resident and evaluated the patient in person. I have verified the content of the note, which accurately reflects my assessment of the patient and the plan of care.   Supervising Physician:  Andi Mistry MD.

## 2024-01-02 NOTE — PROGRESS NOTES
Clinic Administered Medication Documentation        Patient was given Depo Provera. Prior to medication administration, verified patient's identity using patient s name and date of birth. Please see MAR and medication order for additional information. Patient instructed to remain in clinic for 15 minutes and report any adverse reaction to staff immediately.    Vial/Syringe: Single dose vial. Was entire vial of medication used? Yes    NEXT INJECTION DUE: 3/20/24 - 4/17/24    Name of provider who requested the medication administration: Dr. Purdy  Name of provider on site (faculty or community preceptor) at the time of performing the medication administration: Dr. Chin    Date of next administration: 3/20/24 - 4/17/24  Date of next office visit with provider to renew medication plan (must be seen annually): 01/02/2025

## 2024-01-02 NOTE — PROGRESS NOTES
Assessment & Plan     Encounter for management and injection of depo-Provera  23-year-old female with history of genital HSV, PTSD, anxiety, depression, and on Depo for contraception presents for repeat Depo injection within range for repeat injection with the last injection on 10/6/2023.  Sexually active with 1 male partner and uses condoms.  No concerns with current contraceptive method. Due for repeat injection in 3 months.  - medroxyPROGESTERone (DEPO-PROVERA) injection 150 mg    Major depressive disorder  Anxiety  Has history of PTSD, anxiety, and depression with PHQ-9 of 12 today with no suicidal ideation.  KAL-7 score of 19 today.  Recent stressors include passing of family member and stress at work.  Patient is thinking about trying her hydroxyzine prescription that she already has at home.  She is not interested in starting other pharmacological treatment for depression or anxiety today however is open to a mental health referral and appears this has been previously but she has yet to connect with a therapist.  -Appreciate behavioral health assistance in scheduling patient for therapy  -AVS with community mental health services and crisis resources provided    Herpes simplex vulvovaginitis  History of genital HSV since 6662-7308 with current outbreak per patient.  Patient declines vaginal exam today.  Patient declines concerns for other STDs and declines any other testing to be for form today.  - valACYclovir (VALTREX) 500 MG tablet  Dispense: 6 tablet; Refill: 3    Return in about 4 weeks (around 1/30/2024) for Routine preventive.    Bridgette Knott MD  [facility] PEE Reyna is a 23 year old, presenting for the following health issues:  Refill Request        1/2/2024    10:33 AM   Additional Questions   Roomed by mya   Accompanied by self       HPI     Patient presents to receive her routine Depo injection for contraception.  She last received on 10/6/2023.  She  "denies any significant side effects aside with Depo like to continue to get it.    She also shares that she think she has a herpes outbreak at present.  She states she has had HSV since 0861-7551 with more frequent outbreaks at that time now less frequent with most recent occurrence about 4 months ago per patient.  She states she has the same single male sexual partner that she has had for many years.  Denies any new sexual partners or concerns about STDs.  Uses condoms.  Denies wanting to be tested for these today.    Patient also shares that her mood has been up and down in recent months due to passing of a family member and stress at work.  KAL 7 of 19 and PHQ-9 of 12 today.  Denies any suicidal ideation.  Has a prescription for hydroxyzine that she can use as needed however she has not yet tried taking this but has it at home.  She states she was previously on Wellbutrin but did not experience much benefit on this and felt more sad.  She does not want to be on any additional medications for depression at this time.  She is open to a therapy referral and states has been started in the past but she has not yet connected with a therapist.        Objective    /67 (BP Location: Left arm, Patient Position: Sitting, Cuff Size: Adult Regular)   Pulse 73   Temp 98.2  F (36.8  C) (Oral)   Resp 18   Ht 1.753 m (5' 9\")   Wt 58.5 kg (129 lb)   LMP 12/13/2023   SpO2 98%   BMI 19.05 kg/m    Body mass index is 19.05 kg/m .  Physical Exam   Constitutional: awake, alert, cooperative, no apparent distress  ENT: Normocephalic, without obvious abnormality, atraumatic  Respiratory: No increased work of breathing, good air exchange, clear to auscultation bilaterally, no crackles or wheezing  Cardiovascular: Regular rate and rhythm  Neurologic: Cranial nerves II-XII are grossly intact.    ----- Service Performed and Documented by Resident or Fellow ------      "

## 2024-01-02 NOTE — PATIENT INSTRUCTIONS
Thank you for coming into clinic today!    Get your depo injection   medication from pharmacy  You will be contacted for mental health appt  Schedule appointment at  for annual exam  [facility] at [phone] with questions or concerns    Take care,  Bridgette Knott MD      Your doctor has put in a mental health referral for psychotherapy and/or psychiatry treatment. Our behavioral health team will evaluate your referral to see if they have any more recommendations or if they have openings for therapy in-house at [place]. Our referral team would then contact you within 1-2 weeks with this update. However, if you want to get started for psychotherapy or schedule with a psychiatrist sooner, we recommend you call one or two of the following resources.  Let your doctor know if you do connect with a therapist or psychiatrist.     [facility] Central Scheduling  [phone].     [facility]  This is a behavioral health search tool that gives people real time information about availability via updated search engine.    You can search for a provider in your area who takes your insurance and has current openings.    Community Mental Health Agencies:    [facility]   [address]   [address]  [place] [address]  [phone] (for appointments)    [facility]  [address]  [address]  [place] [address]  Phone:  [phone]    [facility]  [address]  [address]  [place] [address]  [phone]    [facility]  [address]  [address]  [place] [address]  [phone]    [facility]  [address]  [address]  [place], [place] [address]  [phone]    [facility]  [address]  [place] [address]  [phone]    [facility]  Psychiatry Agencies:    [facility]  [address]  [address]  [place], [place] [address]  [phone]    [facility]  [address] [place] [address] [address]  [place] [address]  [phone] (for appointments)    [facility]  [address]  [address]  [place] [address]  Phone:  [phone]    [facility]  [address]  [address]  [place] [address]  [phone]    [facility]  [address]  [address]  [place] [address]  [phone]    [facility]  [address]  [place] [address]  [phone]    [facility] - offers psychiatry and psychology services across the lifespan  [facility]  [address] [place] [address]  [address]  [place], [place]  [phone]    Children s Mental Health Agencies:    [facility]  [address] [place] [address]  [phone]  Walk-in evaluations are available in-person (walk into clinic) or virtually (call us) Monday through Thursday from 9 a.m. - 3 p.m. and Friday from 9 a.m. - 2 p.m. with no appointment needed for adults and youth (ages 6-17) with a parent or guardian.    [facility]  [address] [place] [address]  [phone]     [facility]  [address]  [place] [address]  [phone]    [facility]  [address] [place] [address] [address]  [place] [address]  [phone] (for appointments)  Fax: [phone]    [facility]  [address]  [place] [address]  Phone:  [phone]  Fax: [phone]  Hours:  Monday - Friday 8:30 - 5:00pm    CRISIS LINES/SERVICES  If in Immediate danger please go to the ER and/or call 9-1-1  Feeling overwhelmed and just need a supportive person to talk through  a struggling time?   Toll Free [phone] or text  Support  to 11553 (hours 12 PM - 10 PM CST)  The [place] Warmline provides a pdgg-af-wayc approach to mental health recovery, support and wellness. Calls are answered by professionally trained Certified Peer Specialists, who have first hand experience living with a mental health condition. (hours 12 PM - 10 PM CST)    Do you feel like you might be in crisis and potentially need professional services?   National Crisis Lines:  988 - National Suicide Prevention Lifeline  6-995-PWXTXQU (1-169.797.1060) - www.TuneStars  3-870-185-TALK (3344) - www.suicidepreventionlifeline.org  [place]:  Crisis Text Line: Text [place] to 931158. Free support at your fingertips 24/7  People who text [place] to 471206 will be connected with a counselor. Crisis Text Line is available 24 hours a day, seven days a week.  [facility] Multilingual Crisis Line:  [phone]  [facility]:  COPE - Adult (psychiatric crisis, but cannot transport self): [phone]   COPE - Child: [phone]  Acute Psychiatric Services - [facility] (24 hour crisis walk-in and crisis line): [phone]  [address] [place], [place]  [facility] Center (Emergency Psychiatric Evaluation): [phone]  [facility]:  [facility] Adult Crisis Line (crisis counseling and walk-in urgent care mental health): [phone]   Adult Residential Crisis Centers:   [facility] operates two Adult Residential Crisis Centers in the area: [facility] and [facility]  These facilities are a step below in-patient hospital care, providing a three to ten-day stay for individuals who are at a moderate but not a high risk for self-harm. The crisis centers and other [facility] programs can be reached through their central screening at [phone].  Domestic Violence:  [place] Domestic Violence Crisis Line (24-hour [place] crisis line) 1-590.471.2339    If in  Immediate danger please go to the ER and/or call 9-1-1

## 2024-01-03 DIAGNOSIS — F41.9 ANXIETY: ICD-10-CM

## 2024-01-03 DIAGNOSIS — F33.0 MAJOR DEPRESSIVE DISORDER, RECURRENT EPISODE, MILD (H): Primary | ICD-10-CM

## 2024-03-14 ENCOUNTER — OFFICE VISIT (OUTPATIENT)
Dept: FAMILY MEDICINE | Facility: CLINIC | Age: 24
End: 2024-03-14
Payer: COMMERCIAL

## 2024-03-14 VITALS
WEIGHT: 139.4 LBS | OXYGEN SATURATION: 99 % | HEART RATE: 85 BPM | BODY MASS INDEX: 20.65 KG/M2 | DIASTOLIC BLOOD PRESSURE: 73 MMHG | SYSTOLIC BLOOD PRESSURE: 112 MMHG | RESPIRATION RATE: 20 BRPM | HEIGHT: 69 IN | TEMPERATURE: 98.6 F

## 2024-03-14 DIAGNOSIS — Z11.3 SCREEN FOR STD (SEXUALLY TRANSMITTED DISEASE): Primary | ICD-10-CM

## 2024-03-14 DIAGNOSIS — F41.9 ANXIETY: ICD-10-CM

## 2024-03-14 DIAGNOSIS — F33.0 MAJOR DEPRESSIVE DISORDER, RECURRENT EPISODE, MILD (H): ICD-10-CM

## 2024-03-14 DIAGNOSIS — B37.31 YEAST INFECTION OF THE VAGINA: ICD-10-CM

## 2024-03-14 DIAGNOSIS — B96.89 BV (BACTERIAL VAGINOSIS): ICD-10-CM

## 2024-03-14 DIAGNOSIS — N76.0 BV (BACTERIAL VAGINOSIS): ICD-10-CM

## 2024-03-14 LAB
CLUE CELLS: PRESENT
TRICHOMONAS, WET PREP: ABNORMAL
WBC'S/HIGH POWER FIELD, WET PREP: ABNORMAL
YEAST, WET PREP: PRESENT

## 2024-03-14 PROCEDURE — 87491 CHLMYD TRACH DNA AMP PROBE: CPT

## 2024-03-14 PROCEDURE — 87210 SMEAR WET MOUNT SALINE/INK: CPT

## 2024-03-14 PROCEDURE — 99214 OFFICE O/P EST MOD 30 MIN: CPT | Mod: GC

## 2024-03-14 PROCEDURE — 87591 N.GONORRHOEAE DNA AMP PROB: CPT

## 2024-03-14 RX ORDER — METRONIDAZOLE 500 MG/1
500 TABLET ORAL 2 TIMES DAILY
Qty: 14 TABLET | Refills: 0 | Status: SHIPPED | OUTPATIENT
Start: 2024-03-14 | End: 2024-03-21

## 2024-03-14 RX ORDER — FLUCONAZOLE 150 MG/1
150 TABLET ORAL ONCE
Qty: 1 TABLET | Refills: 0 | Status: SHIPPED | OUTPATIENT
Start: 2024-03-14 | End: 2024-03-14

## 2024-03-14 ASSESSMENT — PATIENT HEALTH QUESTIONNAIRE - PHQ9
10. IF YOU CHECKED OFF ANY PROBLEMS, HOW DIFFICULT HAVE THESE PROBLEMS MADE IT FOR YOU TO DO YOUR WORK, TAKE CARE OF THINGS AT HOME, OR GET ALONG WITH OTHER PEOPLE: SOMEWHAT DIFFICULT
SUM OF ALL RESPONSES TO PHQ QUESTIONS 1-9: 8
SUM OF ALL RESPONSES TO PHQ QUESTIONS 1-9: 8

## 2024-03-14 NOTE — PROGRESS NOTES
Assessment & Plan     Yeast infection of the vagina  Patient is concerned for possible yeast infection due to the odor she has been smelling.  She previously had many yeast infections when she was using a sugar scrub.  She is no longer using this or douching.  Yeast was positive today wet prep along with BV.  Plan to treat with fluconazole.  - Wet preparation  - NEISSERIA GONORRHOEA PCR  - CHLAMYDIA TRACHOMATIS PCR  - fluconazole (DIFLUCAN) 150 MG tablet  Dispense: 1 tablet; Refill: 0    BV (bacterial vaginosis)  Patient had concerns as described above so wet prep was obtained which incidentally found to BV.  Plan to treat with metronidazole.  - metroNIDAZOLE (FLAGYL) 500 MG tablet  Dispense: 14 tablet; Refill: 0    Screen for STD (sexually transmitted disease)  Reports that she is currently monogamous with 1 male partner.  Given her symptoms she wants to test for everything.  Even though she has been monogamous she is not 100% sure about her partner.  - NEISSERIA GONORRHOEA PCR  - CHLAMYDIA TRACHOMATIS PCR    Major depressive disorder  Anxiety  Patient has expressed concerns at previous visits about depression and anxiety as well as PTSD.  PHQ-9 today is 8 which is lower than the last check which was 14 in January.  She is stressed by work and her children.  Previously had had some high numbers that die which was contributing to her depression.  She reports that she was previously on Wellbutrin and she felt more sad after taking it.  It does not seem that she was taking it for very long but is that interested in starting medications today.  She has not established with a therapist and has been waiting for social work clinic call.  When I asked about the other community therapy resources she said she still had the information and phone numbers and will try calling them following this visit.    No follow-ups on file.    I have seen and examined the patient.  I have discussed the case with Student Rossi Stephens,  MS3.  I agree with the findings, assessment and plan.     Sumit Avelar MD PGY3  [facility] Medicine Residency    Subjective   Maribell is a 23 year old, presenting for the following health issues:  Vaginal Problem (Yeast infection ) and Light/dark Spots (Dark spot around body for year now, no pain and no itching)      3/14/2024     8:19 AM   Additional Questions   Roomed by msy   Accompanied by self         3/14/2024    Information    services provided? No     HPI     Maribell Singh is a 22 y/o female with history of frequent UTIs and yeast infections presenting today with swelling, itchiness, and a smell of yeast from her private area.  States symptoms started 3 days ago, 1 day after she used essential oils in the area; she suspects this may be the culprit because of timing and history of frequent yeast infections 2/2 use of sugar scrub. Minimal discharge but found a bit of blood on wet prep swab today.  Also mentions mild patches of hyperpigmentation and are flat and dry; she states they start out itchy with a ring of small bumps on the borders but tend to expand and flatten out; says her partner noticed them first and therefore she cannot recall the timing of this evolution. Mood is stable but her time is limited between her job and her son.  Has not seen a therapist, but is able to talk to her best friend and aunt for support.  Has tried Wellbutrin in the past but states it made her more sad.  Endorses mild nausea.      Genitourinary - Female  Onset/Duration: Monday  Description:   Painful urination (Dysuria): No           Frequency: No  Blood in urine (Hematuria): No  Delay in urine (Hesitency): No  Intensity: mild  Progression of Symptoms:  same  Accompanying Signs & Symptoms:  Fever/chills: No  Flank pain: No  Nausea and vomiting: YES  Vaginal symptoms: none  Abdominal/Pelvic Pain: No  History:   History of frequent UTI s: No, previous yes.    History of kidney stones: No  Sexually  Active: YES  Possibility of pregnancy: Don't Know.  Using Depo shots and last 1 was within the last 3 months.      Depression and Anxiety   How are you doing with your depression since your last visit? No change  How are you doing with your anxiety since your last visit?  No change  Are you having other symptoms that might be associated with depression or anxiety? No  Have you had a significant life event? No     Social History     Tobacco Use    Smoking status: Some Days     Types: Cigarettes     Passive exposure: Yes    Smokeless tobacco: Never         3/29/2023    11:17 AM 1/2/2024     5:09 PM 3/14/2024     8:15 AM   PHQ   PHQ-9 Total Score 14 14 8   Q9: Thoughts of better off dead/self-harm past 2 weeks Not at all Not at all Not at all         2/3/2023     3:00 PM 3/29/2023    11:17 AM 1/2/2024     5:09 PM   KAL-7 SCORE   Total Score 16 19 19         3/14/2024     8:15 AM   Last PHQ-9   1.  Little interest or pleasure in doing things 1   2.  Feeling down, depressed, or hopeless 1   3.  Trouble falling or staying asleep, or sleeping too much 3   4.  Feeling tired or having little energy 0   5.  Poor appetite or overeating 2   6.  Feeling bad about yourself 1   7.  Trouble concentrating 0   8.  Moving slowly or restless 0   Q9: Thoughts of better off dead/self-harm past 2 weeks 0   PHQ-9 Total Score 8         1/2/2024     5:09 PM   KAL-7    1. Feeling nervous, anxious, or on edge 2   2. Not being able to stop or control worrying 3   3. Worrying too much about different things 3   4. Trouble relaxing 3   5. Being so restless that it is hard to sit still 3   6. Becoming easily annoyed or irritable 3   7. Feeling afraid, as if something awful might happen 2   KAL-7 Total Score 19   If you checked any problems, how difficult have they made it for you to do your work, take care of things at home, or get along with other people? Very difficult               Objective    /73   Pulse 85   Temp 98.6  F (37  C)  "(Oral)   Resp 20   Ht 1.755 m (5' 9.09\")   Wt 63.2 kg (139 lb 6.4 oz)   LMP  (LMP Unknown)   SpO2 99%   BMI 20.53 kg/m    Body mass index is 20.53 kg/m .  Physical Exam   GENERAL: alert and no distress  SKIN: Scattered few hyperpigmented patches on the upper and lower extremities.  No signs of erythema or excoriation.  PSYCH: mentation appears normal, affect normal/bright    Results for orders placed or performed in visit on 03/14/24 (from the past 24 hour(s))   Wet preparation    Specimen: Vagina; Swab   Result Value Ref Range    Trichomonas Absent Absent    Yeast Present (A) Absent    Clue Cells Present (A) Absent    WBCs/high power field 3+ (A) None    Narrative    Clue cells >20%, bacteria many, odor none           Signed Electronically by: Sumit Avelar MD    Answers submitted by the patient for this visit:  Patient Health Questionnaire (Submitted on 3/14/2024)  If you checked off any problems, how difficult have these problems made it for you to do your work, take care of things at home, or get along with other people?: Somewhat difficult  PHQ9 TOTAL SCORE: 8    "

## 2024-03-14 NOTE — PROGRESS NOTES
Answers submitted by the patient for this visit:  Patient Health Questionnaire (Submitted on 3/14/2024)  If you checked off any problems, how difficult have these problems made it for you to do your work, take care of things at home, or get along with other people?: Somewhat difficult  PHQ9 TOTAL SCORE: 8

## 2024-03-14 NOTE — PROGRESS NOTES
"Preceptor attestation:  Vital signs reviewed: /73   Pulse 85   Temp 98.6  F (37  C) (Oral)   Resp 20   Ht 1.755 m (5' 9.09\")   Wt 63.2 kg (139 lb 6.4 oz)   LMP  (LMP Unknown)   SpO2 99%   BMI 20.53 kg/m      Patient seen, evaluated, and discussed with the resident.  I verified the content of the note, which accurately reflects my assessment of the patient and the plan of care.    Supervising physician: Chela Finney MD  [facility]  "

## 2024-03-15 LAB
C TRACH DNA SPEC QL NAA+PROBE: NEGATIVE
N GONORRHOEA DNA SPEC QL NAA+PROBE: NEGATIVE

## 2024-04-17 ENCOUNTER — ALLIED HEALTH/NURSE VISIT (OUTPATIENT)
Dept: FAMILY MEDICINE | Facility: CLINIC | Age: 24
End: 2024-04-17

## 2024-04-17 VITALS
DIASTOLIC BLOOD PRESSURE: 73 MMHG | RESPIRATION RATE: 20 BRPM | BODY MASS INDEX: 22.76 KG/M2 | SYSTOLIC BLOOD PRESSURE: 110 MMHG | WEIGHT: 150.2 LBS | HEIGHT: 68 IN | OXYGEN SATURATION: 96 % | HEART RATE: 86 BPM | TEMPERATURE: 98.3 F

## 2024-04-17 DIAGNOSIS — Z30.42 ENCOUNTER FOR MANAGEMENT AND INJECTION OF DEPO-PROVERA: Primary | ICD-10-CM

## 2024-04-17 PROCEDURE — 96372 THER/PROPH/DIAG INJ SC/IM: CPT | Performed by: FAMILY MEDICINE

## 2024-04-17 RX ADMIN — MEDROXYPROGESTERONE ACETATE 150 MG: 150 INJECTION, SUSPENSION INTRAMUSCULAR at 15:34

## 2024-04-17 NOTE — PROGRESS NOTES
Clinic Administered Medication Documentation      Depo Provera Documentation    Depo-Provera Standing Order inclusion/exclusion criteria reviewed.     Is this the initial or subsequent dose of Depo Provera? Subsequent dose - patient is within the acceptable window of time (11-15 weeks) for subsequent injection. Pregnancy test not indicated.    Patient meets: inclusion criteria     Is there an active order (written within the past 365 days, with administrations remaining, not ) in the chart? Yes.     Prior to injection, verified patient identity using patient's name and date of birth. Medication was administered. Please see MAR and medication order for additional information.     Vial/Syringe: Single dose vial. Was entire vial of medication used? Yes    Patient instructed to remain in clinic for 15 minutes and report any adverse reaction to staff immediately.  NEXT INJECTION DUE: 7/3/24 - 24    Verified that the patient has refills remaining in their prescription.    Name of provider who requested the medication administration: Dr. Bustos  Name of provider on site (faculty or community preceptor) at the time of performing the medication administration: Dr. Manley    Date of next administration: 24 to 24  Date of next office visit with provider to renew medication plan (must be seen annually): 25      Jonathan Redding MA

## 2024-07-13 ENCOUNTER — HEALTH MAINTENANCE LETTER (OUTPATIENT)
Age: 24
End: 2024-07-13

## 2024-09-06 ENCOUNTER — OFFICE VISIT (OUTPATIENT)
Dept: FAMILY MEDICINE | Facility: CLINIC | Age: 24
End: 2024-09-06

## 2024-09-06 VITALS
SYSTOLIC BLOOD PRESSURE: 104 MMHG | BODY MASS INDEX: 22.07 KG/M2 | OXYGEN SATURATION: 98 % | HEART RATE: 88 BPM | DIASTOLIC BLOOD PRESSURE: 69 MMHG | RESPIRATION RATE: 18 BRPM | HEIGHT: 69 IN | WEIGHT: 149 LBS | TEMPERATURE: 98 F

## 2024-09-06 DIAGNOSIS — Z30.42 ENCOUNTER FOR MANAGEMENT AND INJECTION OF DEPO-PROVERA: Primary | ICD-10-CM

## 2024-09-06 LAB — HCG UR QL: NEGATIVE

## 2024-09-06 PROCEDURE — 81025 URINE PREGNANCY TEST: CPT

## 2024-09-06 PROCEDURE — 96372 THER/PROPH/DIAG INJ SC/IM: CPT | Performed by: FAMILY MEDICINE

## 2024-09-06 PROCEDURE — 99212 OFFICE O/P EST SF 10 MIN: CPT | Mod: 25

## 2024-09-06 RX ORDER — MEDROXYPROGESTERONE ACETATE 150 MG/ML
150 INJECTION, SUSPENSION INTRAMUSCULAR
Status: ACTIVE | OUTPATIENT
Start: 2024-09-06 | End: 2025-09-01

## 2024-09-06 RX ADMIN — MEDROXYPROGESTERONE ACETATE 150 MG: 150 INJECTION, SUSPENSION INTRAMUSCULAR at 09:49

## 2024-09-06 ASSESSMENT — PATIENT HEALTH QUESTIONNAIRE - PHQ9
SUM OF ALL RESPONSES TO PHQ QUESTIONS 1-9: 5
SUM OF ALL RESPONSES TO PHQ QUESTIONS 1-9: 5
10. IF YOU CHECKED OFF ANY PROBLEMS, HOW DIFFICULT HAVE THESE PROBLEMS MADE IT FOR YOU TO DO YOUR WORK, TAKE CARE OF THINGS AT HOME, OR GET ALONG WITH OTHER PEOPLE: SOMEWHAT DIFFICULT

## 2024-09-06 NOTE — PROGRESS NOTES
"Preceptor attestation:  Vital signs reviewed: /69   Pulse 88   Temp 98  F (36.7  C) (Oral)   Resp 18   Ht 1.753 m (5' 9\")   Wt 67.6 kg (149 lb)   LMP  (LMP Unknown)   SpO2 98%   BMI 22.00 kg/m      Patient seen, evaluated, and discussed with the resident.  I verified the content of the note, which accurately reflects my assessment of the patient and the plan of care.    Supervising physician: Chela Finney MD  [facility]  "

## 2024-09-06 NOTE — PROGRESS NOTES
Clinic Administered Medication Documentation        Patient was given Depo Provera. Prior to medication administration, verified patient's identity using patient s name and date of birth. Please see MAR and medication order for additional information. Patient instructed to remain in clinic for 15 minutes and report any adverse reaction to staff immediately.    Vial/Syringe: Single dose vial. Was entire vial of medication used? Yes    NEXT INJECTION DUE: 11/22/24 - 12/20/24    Name of provider who requested the medication administration:    Name of provider on site (faculty or community preceptor) at the time of performing the medication administration: Dr. Finney    Date of next administration: 11/22/24-12/20/24  Date of next office visit with provider to renew medication plan (must be seen annually): 9/6/2025        Answers submitted by the patient for this visit:  Patient Health Questionnaire (Submitted on 9/6/2024)  If you checked off any problems, how difficult have these problems made it for you to do your work, take care of things at home, or get along with other people?: Somewhat difficult  PHQ9 TOTAL SCORE: 5

## 2024-09-06 NOTE — PROGRESS NOTES
"  Assessment & Plan     Encounter for management and injection of depo-Provera  Presents for contraception via Depo-Provera injection.  Last Depo injection in January 2024.  Patient continues to be sexually active with 1 male partner, last 2 days ago.  UPT in clinic today negative.  Discussed possibility of early pregnancy and risk of hypospadias if patient is pregnant with male fetus.  Patient started received Depo today and will consider repeat pregnancy test in the next 2 to 4 weeks.  Follow-up in 3 months for repeat injection.  Patient also due for annual preventative exam and will schedule this when able.  She is up-to-date on Pap smear.  - HCG qualitative urine  - HCG qualitative urine  - medroxyPROGESTERone (DEPO-PROVERA) injection 150 mg      Return in about 3 months (around 12/6/2024) for Follow up.    Zuleyma Reyna is a 24 year old, presenting for the following health issues:  Contraception (Late depo )        9/6/2024     9:07 AM   Additional Questions   Roomed by Ml   Accompanied by self         9/6/2024    Information    services provided? No        HPI     Presents to receive Depo injection.  Patient most recently received this in January 2024.  She missed her subsequent Depo injection.  She is sexually active with 1 male partner.  She is not use any sort of protection.  She is not concerned about any sexually transmitted diseases.  She denies any vaginal discharge or odor.  No dysuria.  No skin changes in her  area.  She has had light bleeding since late July intermittently.  She was last sexually active 2 days ago.      Objective    /69   Pulse 88   Temp 98  F (36.7  C) (Oral)   Resp 18   Ht 1.753 m (5' 9\")   Wt 67.6 kg (149 lb)   LMP  (LMP Unknown)   SpO2 98%   BMI 22.00 kg/m    Body mass index is 22 kg/m .  Physical Exam   Constitutional: healthy, alert, no distress, and cooperative  Head: normocephalic  Cardiovascular: appears well perfused  Respiratory: " breathing comfortably on RA  Musculoskeletal: extremities normal- no gross deformities noted, gait normal  Skin: no suspicious lesions or rashes on exposed skin  Neurologic: grossly normal CN  Psychiatric: mentation appears normal and affect normal        Signed Electronically by: Bridgette Knott MD

## 2025-04-23 ENCOUNTER — TELEPHONE (OUTPATIENT)
Dept: FAMILY MEDICINE | Facility: CLINIC | Age: 25
End: 2025-04-23

## 2025-04-23 NOTE — TELEPHONE ENCOUNTER
Patient has no current/active insurance, appt coming up on 04/25/25. Called number on file, no success, left  w/ call back number for clinic. Will try calling patient again tomorrow regarding appointment.     Aydin Acosta on 4/23/2025 at 2:07 PM

## 2025-07-19 ENCOUNTER — HEALTH MAINTENANCE LETTER (OUTPATIENT)
Age: 25
End: 2025-07-19